# Patient Record
Sex: MALE | Race: WHITE | NOT HISPANIC OR LATINO | Employment: OTHER | URBAN - METROPOLITAN AREA
[De-identification: names, ages, dates, MRNs, and addresses within clinical notes are randomized per-mention and may not be internally consistent; named-entity substitution may affect disease eponyms.]

---

## 2022-04-25 DIAGNOSIS — R05.1 ACUTE COUGH: Primary | ICD-10-CM

## 2022-09-23 ENCOUNTER — ANESTHESIA EVENT (OUTPATIENT)
Dept: SURGERY | Facility: HOSPITAL | Age: 72
DRG: 521 | End: 2022-09-23
Payer: MEDICARE

## 2022-09-23 ENCOUNTER — HOSPITAL ENCOUNTER (INPATIENT)
Facility: HOSPITAL | Age: 72
LOS: 5 days | Discharge: NURSING, REHAB OR LONG-TERM CARE | DRG: 521 | End: 2022-09-28
Attending: EMERGENCY MEDICINE | Admitting: FAMILY MEDICINE
Payer: MEDICARE

## 2022-09-23 DIAGNOSIS — Z95.5 H/O HEART ARTERY STENT: ICD-10-CM

## 2022-09-23 DIAGNOSIS — S72.142A: Primary | ICD-10-CM

## 2022-09-23 DIAGNOSIS — S72.002A DISPLACED FRACTURE OF LEFT FEMORAL NECK: ICD-10-CM

## 2022-09-23 DIAGNOSIS — Z01.818 PRE-OP EVALUATION: ICD-10-CM

## 2022-09-23 DIAGNOSIS — M84.452A: ICD-10-CM

## 2022-09-23 DIAGNOSIS — Z86.79 H/O CHF: ICD-10-CM

## 2022-09-23 DIAGNOSIS — I25.10 CAD (CORONARY ARTERY DISEASE): ICD-10-CM
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
## 2022-09-23 DIAGNOSIS — Z95.810 AICD (AUTOMATIC CARDIOVERTER/DEFIBRILLATOR) PRESENT: ICD-10-CM

## 2022-09-23 PROBLEM — I50.9 CHF (CONGESTIVE HEART FAILURE): Status: ACTIVE | Noted: 2022-09-23

## 2022-09-23 LAB
ANION GAP SERPL CALC-SCNC: 11 MMOL/L (ref 8–16)
AV INDEX (PROSTH): 0.91
AV MEAN GRADIENT: 3 MMHG
AV PEAK GRADIENT: 7 MMHG
AV VALVE AREA: 3.13 CM2
AV VELOCITY RATIO: 0.85
BASOPHILS # BLD AUTO: 0.03 K/UL (ref 0–0.2)
BASOPHILS NFR BLD: 0.3 % (ref 0–1.9)
BSA FOR ECHO PROCEDURE: 2 M2
BUN SERPL-MCNC: 9 MG/DL (ref 8–23)
CALCIUM SERPL-MCNC: 8.6 MG/DL (ref 8.7–10.5)
CHLORIDE SERPL-SCNC: 104 MMOL/L (ref 95–110)
CO2 SERPL-SCNC: 27 MMOL/L (ref 23–29)
CREAT SERPL-MCNC: 0.8 MG/DL (ref 0.5–1.4)
CV ECHO LV RWT: 0.5 CM
DIFFERENTIAL METHOD: ABNORMAL
DOP CALC AO PEAK VEL: 1.3 M/S
DOP CALC AO VTI: 20.1 CM
DOP CALC LVOT AREA: 3.5 CM2
DOP CALC LVOT DIAMETER: 2.1 CM
DOP CALC LVOT PEAK VEL: 1.1 M/S
DOP CALC LVOT STROKE VOLUME: 63.01 CM3
DOP CALCLVOT PEAK VEL VTI: 18.2 CM
ECHO LV POSTERIOR WALL: 1.3 CM (ref 0.6–1.1)
EJECTION FRACTION: 35 %
EOSINOPHIL # BLD AUTO: 0 K/UL (ref 0–0.5)
EOSINOPHIL NFR BLD: 0.2 % (ref 0–8)
ERYTHROCYTE [DISTWIDTH] IN BLOOD BY AUTOMATED COUNT: 15.1 % (ref 11.5–14.5)
EST. GFR  (NO RACE VARIABLE): >60 ML/MIN/1.73 M^2
ESTIMATED AVG GLUCOSE: 108 MG/DL (ref 68–131)
FRACTIONAL SHORTENING: 27 % (ref 28–44)
GLUCOSE SERPL-MCNC: 106 MG/DL (ref 70–110)
HBA1C MFR BLD: 5.4 % (ref 4–5.6)
HCT VFR BLD AUTO: 47.7 % (ref 40–54)
HGB BLD-MCNC: 15.7 G/DL (ref 14–18)
IMM GRANULOCYTES # BLD AUTO: 0.03 K/UL (ref 0–0.04)
IMM GRANULOCYTES NFR BLD AUTO: 0.3 % (ref 0–0.5)
INR PPP: 1 (ref 0.8–1.2)
INTERVENTRICULAR SEPTUM: 1.2 CM (ref 0.6–1.1)
IVC DIAMETER: 1.6 CM
LA MAJOR: 5 CM
LA MINOR: 4.7 CM
LA WIDTH: 3.2 CM
LEFT ATRIUM SIZE: 3.5 CM
LEFT ATRIUM VOLUME INDEX: 23.2 ML/M2
LEFT ATRIUM VOLUME: 46.13 CM3
LEFT INTERNAL DIMENSION IN SYSTOLE: 3.8 CM (ref 2.1–4)
LEFT VENTRICLE MASS INDEX: 132 G/M2
LEFT VENTRICULAR INTERNAL DIMENSION IN DIASTOLE: 5.2 CM (ref 3.5–6)
LEFT VENTRICULAR MASS: 263.45 G
LYMPHOCYTES # BLD AUTO: 1.4 K/UL (ref 1–4.8)
LYMPHOCYTES NFR BLD: 15.3 % (ref 18–48)
MCH RBC QN AUTO: 34 PG (ref 27–31)
MCHC RBC AUTO-ENTMCNC: 32.9 G/DL (ref 32–36)
MCV RBC AUTO: 103 FL (ref 82–98)
MONOCYTES # BLD AUTO: 0.5 K/UL (ref 0.3–1)
MONOCYTES NFR BLD: 5.4 % (ref 4–15)
MV PEAK GRADIENT: 50 MMHG
NEUTROPHILS # BLD AUTO: 7.4 K/UL (ref 1.8–7.7)
NEUTROPHILS NFR BLD: 78.5 % (ref 38–73)
NRBC BLD-RTO: 0 /100 WBC
PISA MRMAX VEL: 3.5 M/S
PISA TR MAX VEL: 3.48 M/S
PLATELET # BLD AUTO: 93 K/UL (ref 150–450)
PMV BLD AUTO: 11.3 FL (ref 9.2–12.9)
POCT GLUCOSE: 122 MG/DL (ref 70–110)
POCT GLUCOSE: 90 MG/DL (ref 70–110)
POTASSIUM SERPL-SCNC: 3.7 MMOL/L (ref 3.5–5.1)
PROTHROMBIN TIME: 10.4 SEC (ref 9–12.5)
RA MAJOR: 5.4 CM
RA PRESSURE: 3 MMHG
RA WIDTH: 3.6 CM
RBC # BLD AUTO: 4.62 M/UL (ref 4.6–6.2)
RIGHT VENTRICULAR END-DIASTOLIC DIMENSION: 4.1 CM
SARS-COV-2 RDRP RESP QL NAA+PROBE: NEGATIVE
SODIUM SERPL-SCNC: 142 MMOL/L (ref 136–145)
TR MAX PG: 48 MMHG
TSH SERPL DL<=0.005 MIU/L-ACNC: 0.99 UIU/ML (ref 0.4–4)
TV REST PULMONARY ARTERY PRESSURE: 51 MMHG
WBC # BLD AUTO: 9.4 K/UL (ref 3.9–12.7)

## 2022-09-23 PROCEDURE — 96374 THER/PROPH/DIAG INJ IV PUSH: CPT

## 2022-09-23 PROCEDURE — 93010 EKG 12-LEAD: ICD-10-PCS | Mod: ,,, | Performed by: INTERNAL MEDICINE

## 2022-09-23 PROCEDURE — 99223 PR INITIAL HOSPITAL CARE,LEVL III: ICD-10-PCS | Mod: ,,, | Performed by: INTERNAL MEDICINE

## 2022-09-23 PROCEDURE — 63600175 PHARM REV CODE 636 W HCPCS: Performed by: STUDENT IN AN ORGANIZED HEALTH CARE EDUCATION/TRAINING PROGRAM

## 2022-09-23 PROCEDURE — 83036 HEMOGLOBIN GLYCOSYLATED A1C: CPT

## 2022-09-23 PROCEDURE — 25000003 PHARM REV CODE 250: Performed by: NURSE PRACTITIONER

## 2022-09-23 PROCEDURE — 99223 PR INITIAL HOSPITAL CARE,LEVL III: ICD-10-PCS | Mod: ,,, | Performed by: NURSE PRACTITIONER

## 2022-09-23 PROCEDURE — 80048 BASIC METABOLIC PNL TOTAL CA: CPT | Performed by: EMERGENCY MEDICINE

## 2022-09-23 PROCEDURE — 93005 ELECTROCARDIOGRAM TRACING: CPT

## 2022-09-23 PROCEDURE — 99900035 HC TECH TIME PER 15 MIN (STAT)

## 2022-09-23 PROCEDURE — 25000003 PHARM REV CODE 250: Performed by: FAMILY MEDICINE

## 2022-09-23 PROCEDURE — 63600175 PHARM REV CODE 636 W HCPCS

## 2022-09-23 PROCEDURE — U0002 COVID-19 LAB TEST NON-CDC: HCPCS | Performed by: EMERGENCY MEDICINE

## 2022-09-23 PROCEDURE — 25000003 PHARM REV CODE 250

## 2022-09-23 PROCEDURE — 84443 ASSAY THYROID STIM HORMONE: CPT

## 2022-09-23 PROCEDURE — 99223 1ST HOSP IP/OBS HIGH 75: CPT | Mod: ,,, | Performed by: NURSE PRACTITIONER

## 2022-09-23 PROCEDURE — 11000001 HC ACUTE MED/SURG PRIVATE ROOM

## 2022-09-23 PROCEDURE — 99285 EMERGENCY DEPT VISIT HI MDM: CPT | Mod: 25

## 2022-09-23 PROCEDURE — 85025 COMPLETE CBC W/AUTO DIFF WBC: CPT | Performed by: EMERGENCY MEDICINE

## 2022-09-23 PROCEDURE — 99223 1ST HOSP IP/OBS HIGH 75: CPT | Mod: ,,, | Performed by: INTERNAL MEDICINE

## 2022-09-23 PROCEDURE — 94761 N-INVAS EAR/PLS OXIMETRY MLT: CPT

## 2022-09-23 PROCEDURE — 93010 ELECTROCARDIOGRAM REPORT: CPT | Mod: ,,, | Performed by: INTERNAL MEDICINE

## 2022-09-23 PROCEDURE — 27000221 HC OXYGEN, UP TO 24 HOURS

## 2022-09-23 PROCEDURE — 63600175 PHARM REV CODE 636 W HCPCS: Performed by: EMERGENCY MEDICINE

## 2022-09-23 PROCEDURE — 85610 PROTHROMBIN TIME: CPT | Performed by: STUDENT IN AN ORGANIZED HEALTH CARE EDUCATION/TRAINING PROGRAM

## 2022-09-23 RX ORDER — ACETAMINOPHEN 325 MG/1
650 TABLET ORAL EVERY 4 HOURS PRN
Status: DISCONTINUED | OUTPATIENT
Start: 2022-09-23 | End: 2022-09-24

## 2022-09-23 RX ORDER — HYDROMORPHONE HYDROCHLORIDE 1 MG/ML
1 INJECTION, SOLUTION INTRAMUSCULAR; INTRAVENOUS; SUBCUTANEOUS EVERY 4 HOURS PRN
Status: DISCONTINUED | OUTPATIENT
Start: 2022-09-23 | End: 2022-09-23

## 2022-09-23 RX ORDER — IBUPROFEN 200 MG
16 TABLET ORAL
Status: DISCONTINUED | OUTPATIENT
Start: 2022-09-23 | End: 2022-09-28 | Stop reason: HOSPADM

## 2022-09-23 RX ORDER — SODIUM CHLORIDE 0.9 % (FLUSH) 0.9 %
5 SYRINGE (ML) INJECTION
Status: DISCONTINUED | OUTPATIENT
Start: 2022-09-23 | End: 2022-09-28 | Stop reason: HOSPADM

## 2022-09-23 RX ORDER — FLUTICASONE PROPIONATE 50 MCG
2 SPRAY, SUSPENSION (ML) NASAL DAILY
COMMUNITY
Start: 2022-04-20

## 2022-09-23 RX ORDER — METOPROLOL SUCCINATE 200 MG/1
200 TABLET, EXTENDED RELEASE ORAL DAILY
COMMUNITY

## 2022-09-23 RX ORDER — BUDESONIDE AND FORMOTEROL FUMARATE DIHYDRATE 160; 4.5 UG/1; UG/1
2 AEROSOL RESPIRATORY (INHALATION) 2 TIMES DAILY
COMMUNITY
Start: 2022-06-07

## 2022-09-23 RX ORDER — FENTANYL CITRATE 50 UG/ML
50 INJECTION, SOLUTION INTRAMUSCULAR; INTRAVENOUS
Status: COMPLETED | OUTPATIENT
Start: 2022-09-23 | End: 2022-09-23

## 2022-09-23 RX ORDER — ASPIRIN 81 MG/1
81 TABLET ORAL DAILY
COMMUNITY

## 2022-09-23 RX ORDER — LOSARTAN POTASSIUM 25 MG/1
25 TABLET ORAL DAILY
Status: ON HOLD | COMMUNITY
End: 2022-09-28 | Stop reason: HOSPADM

## 2022-09-23 RX ORDER — IBUPROFEN 200 MG
24 TABLET ORAL
Status: DISCONTINUED | OUTPATIENT
Start: 2022-09-23 | End: 2022-09-28 | Stop reason: HOSPADM

## 2022-09-23 RX ORDER — LIDOCAINE 50 MG/G
1 PATCH TOPICAL DAILY PRN
Status: DISCONTINUED | OUTPATIENT
Start: 2022-09-23 | End: 2022-09-28 | Stop reason: HOSPADM

## 2022-09-23 RX ORDER — HYDROMORPHONE HYDROCHLORIDE 1 MG/ML
1 INJECTION, SOLUTION INTRAMUSCULAR; INTRAVENOUS; SUBCUTANEOUS EVERY 6 HOURS PRN
Status: DISCONTINUED | OUTPATIENT
Start: 2022-09-23 | End: 2022-09-26

## 2022-09-23 RX ORDER — GLUCAGON 1 MG
1 KIT INJECTION
Status: DISCONTINUED | OUTPATIENT
Start: 2022-09-23 | End: 2022-09-28 | Stop reason: HOSPADM

## 2022-09-23 RX ORDER — CETIRIZINE HYDROCHLORIDE 10 MG/1
10 TABLET ORAL DAILY
COMMUNITY
Start: 2022-04-20

## 2022-09-23 RX ORDER — IBUPROFEN 200 MG
TABLET ORAL
COMMUNITY
Start: 2022-06-27

## 2022-09-23 RX ORDER — PRAVASTATIN SODIUM 40 MG/1
40 TABLET ORAL DAILY
Status: DISCONTINUED | OUTPATIENT
Start: 2022-09-24 | End: 2022-09-28 | Stop reason: HOSPADM

## 2022-09-23 RX ORDER — METHYLPREDNISOLONE 4 MG/1
TABLET ORAL
COMMUNITY
Start: 2022-04-20 | End: 2022-09-23

## 2022-09-23 RX ORDER — OXYCODONE AND ACETAMINOPHEN 5; 325 MG/1; MG/1
1 TABLET ORAL EVERY 6 HOURS PRN
Status: DISCONTINUED | OUTPATIENT
Start: 2022-09-23 | End: 2022-09-26

## 2022-09-23 RX ORDER — BUPROPION HYDROCHLORIDE 150 MG/1
150 TABLET, FILM COATED, EXTENDED RELEASE ORAL 2 TIMES DAILY
COMMUNITY
Start: 2022-06-11

## 2022-09-23 RX ORDER — HYDROMORPHONE HYDROCHLORIDE 1 MG/ML
1 INJECTION, SOLUTION INTRAMUSCULAR; INTRAVENOUS; SUBCUTANEOUS ONCE
Status: DISCONTINUED | OUTPATIENT
Start: 2022-09-23 | End: 2022-09-23

## 2022-09-23 RX ORDER — METOPROLOL TARTRATE 50 MG/1
50 TABLET ORAL 2 TIMES DAILY
Status: DISCONTINUED | OUTPATIENT
Start: 2022-09-23 | End: 2022-09-28 | Stop reason: HOSPADM

## 2022-09-23 RX ORDER — ALBUTEROL SULFATE 90 UG/1
2 AEROSOL, METERED RESPIRATORY (INHALATION) EVERY 6 HOURS PRN
COMMUNITY
Start: 2022-04-15

## 2022-09-23 RX ORDER — DM/P-EPHED/ACETAMINOPH/DOXYLAM 30-7.5/3
LIQUID (ML) ORAL
COMMUNITY
Start: 2022-06-18

## 2022-09-23 RX ORDER — MUPIROCIN 20 MG/G
OINTMENT TOPICAL 2 TIMES DAILY
Status: COMPLETED | OUTPATIENT
Start: 2022-09-23 | End: 2022-09-27

## 2022-09-23 RX ORDER — SPIRONOLACTONE 25 MG/1
25 TABLET ORAL DAILY
Status: ON HOLD | COMMUNITY
End: 2022-09-28 | Stop reason: HOSPADM

## 2022-09-23 RX ORDER — AMOXICILLIN 250 MG
1 CAPSULE ORAL 2 TIMES DAILY PRN
Status: DISCONTINUED | OUTPATIENT
Start: 2022-09-23 | End: 2022-09-28 | Stop reason: HOSPADM

## 2022-09-23 RX ORDER — TALC
9 POWDER (GRAM) TOPICAL NIGHTLY PRN
Status: DISCONTINUED | OUTPATIENT
Start: 2022-09-23 | End: 2022-09-28 | Stop reason: HOSPADM

## 2022-09-23 RX ORDER — IPRATROPIUM BROMIDE AND ALBUTEROL SULFATE 2.5; .5 MG/3ML; MG/3ML
3 SOLUTION RESPIRATORY (INHALATION) EVERY 4 HOURS PRN
Status: DISCONTINUED | OUTPATIENT
Start: 2022-09-23 | End: 2022-09-25

## 2022-09-23 RX ORDER — PRAVASTATIN SODIUM 40 MG/1
40 TABLET ORAL DAILY
COMMUNITY

## 2022-09-23 RX ORDER — ACETAMINOPHEN 325 MG/1
650 TABLET ORAL EVERY 8 HOURS PRN
Status: DISCONTINUED | OUTPATIENT
Start: 2022-09-23 | End: 2022-09-24

## 2022-09-23 RX ORDER — ONDANSETRON 8 MG/1
8 TABLET, ORALLY DISINTEGRATING ORAL EVERY 8 HOURS PRN
Status: DISCONTINUED | OUTPATIENT
Start: 2022-09-23 | End: 2022-09-28 | Stop reason: HOSPADM

## 2022-09-23 RX ORDER — ONDANSETRON 2 MG/ML
4 INJECTION INTRAMUSCULAR; INTRAVENOUS EVERY 8 HOURS PRN
Status: DISCONTINUED | OUTPATIENT
Start: 2022-09-23 | End: 2022-09-23

## 2022-09-23 RX ORDER — INSULIN ASPART 100 [IU]/ML
1-10 INJECTION, SOLUTION INTRAVENOUS; SUBCUTANEOUS
Status: DISCONTINUED | OUTPATIENT
Start: 2022-09-23 | End: 2022-09-26

## 2022-09-23 RX ORDER — FUROSEMIDE 20 MG/1
20 TABLET ORAL 2 TIMES DAILY
COMMUNITY

## 2022-09-23 RX ORDER — MORPHINE SULFATE 2 MG/ML
2 INJECTION, SOLUTION INTRAMUSCULAR; INTRAVENOUS EVERY 4 HOURS PRN
Status: DISCONTINUED | OUTPATIENT
Start: 2022-09-23 | End: 2022-09-23

## 2022-09-23 RX ADMIN — MORPHINE SULFATE 2 MG: 2 INJECTION, SOLUTION INTRAMUSCULAR; INTRAVENOUS at 11:09

## 2022-09-23 RX ADMIN — METOPROLOL TARTRATE 50 MG: 50 TABLET, FILM COATED ORAL at 04:09

## 2022-09-23 RX ADMIN — FENTANYL CITRATE 50 MCG: 50 INJECTION INTRAMUSCULAR; INTRAVENOUS at 09:09

## 2022-09-23 RX ADMIN — MUPIROCIN: 20 OINTMENT TOPICAL at 04:09

## 2022-09-23 RX ADMIN — LIDOCAINE 1 PATCH: 50 PATCH CUTANEOUS at 11:09

## 2022-09-23 RX ADMIN — HYDROMORPHONE HYDROCHLORIDE 1 MG: 1 INJECTION, SOLUTION INTRAMUSCULAR; INTRAVENOUS; SUBCUTANEOUS at 12:09

## 2022-09-23 RX ADMIN — HYDROMORPHONE HYDROCHLORIDE 1 MG: 1 INJECTION, SOLUTION INTRAMUSCULAR; INTRAVENOUS; SUBCUTANEOUS at 04:09

## 2022-09-23 RX ADMIN — MUPIROCIN: 20 OINTMENT TOPICAL at 08:09

## 2022-09-23 NOTE — PLAN OF CARE
[facility] - Emergency Department  Initial Discharge Assessment       Primary Care Provider: Primary Doctor No    Admission Diagnosis: CAD (coronary artery disease) [I25.10]  Displaced fracture of left femoral neck [S72.002A]  Pre-op evaluation [Z01.818]  Fall, initial encounter [W19.XXXA]  Closed 2-part intertrochanteric fracture of proximal end of left femur, initial encounter [S72.142A]    Admission Date: 9/23/2022  Expected Discharge Date:   surgery scheduled for tomorrow @ 0800 by Dr. Woodruff.   Discharge Barriers Identified: (P) None    Payor: [employer] MEDICARE / Plan: [employer] 65 / Product Type: Medicare Advantage /     Extended Emergency Contact Information  Primary Emergency Contact: joshua mclaughlin  Address: [address] [place] [address] [place] [address] [facility]  Home Phone: [phone]  Relation: Spouse    Discharge Plan A: (P) Home Health  Discharge Plan B: (P) Home      [id number]  [place] - [address]  [address]  [place] [address]  Phone: [phone] Fax: [phone]      Initial Assessment (most recent)       Adult Discharge Assessment - 09/23/22 1343          Discharge Assessment    Assessment Type Discharge Planning Assessment (P)      Confirmed/corrected address, phone number and insurance Yes (P)      Source of Information patient;family (P)      When was your last doctors appointment? -- (P)    years ago    Reason For Admission fall (P)      Lives With spouse (P)      Do you expect to return to your current living situation? Yes (P)      Do you have help at home or someone to help you manage your care at home? No (P)      Current cognitive status: Alert/Oriented (P)      Walking or Climbing Stairs Difficulty none (P)      Home Accessibility wheelchair accessible (P)      Home Layout Able to live on 1st floor (P)      Equipment Currently Used at Home walker, rolling;wheelchair;cane, straight (P)    prn as needed    Patient currently  being followed by outpatient case management? No (P)      Do you currently have service(s) that help you manage your care at home? No (P)      Do you take prescription medications? Yes (P)      Do you have prescription coverage? Yes (P)      Is the patient taking medications as prescribed? no (P)      Who is going to help you get home at discharge? joshua mclaughlin (Spouse)   [phone] (P)      How do you get to doctors appointments? car, drives self;family or friend will provide (P)      Are you on dialysis? No (P)      Discharge Plan A Home Health (P)      Discharge Plan B Home (P)      Discharge Plan discussed with: Patient;Spouse/sig other (P)      Discharge Barriers Identified None (P)         Physical Activity    On average, how many days per week do you engage in moderate to strenuous exercise (like a brisk walk)? 1 day (P)         Housing Stability    In the last 12 months, was there a time when you were not able to pay the mortgage or rent on time? No (P)         Transportation Needs    In the past 12 months, has lack of transportation kept you from medical appointments or from getting medications? No (P)      In the past 12 months, has lack of transportation kept you from meetings, work, or from getting things needed for daily living? No (P)         Food Insecurity    Within the past 12 months, you worried that your food would run out before you got the money to buy more. Never true (P)      Within the past 12 months, the food you bought just didn't last and you didn't have money to get more. Never true (P)         Stress    Do you feel stress - tense, restless, nervous, or anxious, or unable to sleep at night because your mind is troubled all the time - these days? Rather much (P)         Social Connections    In a typical week, how many times do you talk on the phone with family, friends, or neighbors? Never (P)      How often do you get together with friends or relatives? Twice a week (P)    neighbors  "check on them    How often do you attend Gnosticist or Sabianist services? Never (P)      Do you belong to any clubs or organizations such as Gnosticist groups, unions, fraternal or athletic groups, or school groups? No (P)      Are you , , , , never , or living with a partner?  (P)         Relationship/Environment    Name(s) of Who Lives With Patient joshua mclaughlin (Spouse)   [phone] (P)                    MICHEL spoke with pt and wife Manjeet at bedside. Pt and wife have been having a hard time at home since repairs have been needed at their residence. Pt states neighbors come by to check in on them, and kids mow the lawn. Pt states they could "use more help at home." MICHEL confirmed pt has [employer]. MICHEL sent fax notification to [facility] requesting auth for Home Health at d/c. Pt's wife will transport home. Pt has equipement he only uses as needed: rolling walker, can, and wheelchair. MICHEL discussed medication compliance. Pt has not been able to see PCP since after [facility], his regular provider had shut down. MICHEL requested follow up with [facility] clinic via [facility]. MICHEL provided information on dry erase board and encouraged them to reach out for any needs.    Cheyenne Zapata, [facility] [place]  [facility] Social Work  [phone]               "

## 2022-09-23 NOTE — SUBJECTIVE & OBJECTIVE
No past medical history on file.    No past surgical history on file.    Review of patient's allergies indicates:  No Known Allergies    No current facility-administered medications on file prior to encounter.     Current Outpatient Medications on File Prior to Encounter   Medication Sig    albuterol (PROVENTIL/VENTOLIN HFA) 90 mcg/actuation inhaler Inhale 2 puffs into the lungs every 6 (six) hours as needed.    cetirizine (ZYRTEC) 10 MG tablet Take 10 mg by mouth once daily.    fluticasone propionate (FLONASE) 50 mcg/actuation nasal spray 2 sprays by Each Nostril route once daily.    furosemide (LASIX) 20 MG tablet Take 20 mg by mouth 2 (two) times daily.    losartan (COZAAR) 25 MG tablet Take 25 mg by mouth once daily.    metoprolol succinate (TOPROL-XL) 200 MG 24 hr tablet Take 200 mg by mouth once daily.    pravastatin (PRAVACHOL) 40 MG tablet Take 40 mg by mouth once daily.    spironolactone (ALDACTONE) 25 MG tablet Take 25 mg by mouth once daily.    SYMBICORT 160-4.5 mcg/actuation HFAA Inhale 2 puffs into the lungs 2 (two) times a day.    aspirin (ECOTRIN) 81 MG EC tablet Take 81 mg by mouth once daily.    buPROPion HCL, smoking deter, (ZYBAN) 150 mg TBSR 12 hr tablet Take 150 mg by mouth 2 (two) times a day.    nicotine (NICODERM CQ) 21 mg/24 hr SMARTSIG:Patch(s) Topical Daily    nicotine polacrilex 2 MG Lozg SMARTSI Lozenge(s) By Mouth Every 2 Hours PRN    [DISCONTINUED] methylPREDNISolone (MEDROL DOSEPACK) 4 mg tablet use as directed     Family History    None       Tobacco Use    Smoking status: Not on file    Smokeless tobacco: Not on file   Substance and Sexual Activity    Alcohol use: Not on file    Drug use: Not on file    Sexual activity: Not on file     Review of Systems   Constitutional: Negative for chills, decreased appetite, diaphoresis and fever.   Cardiovascular:  Positive for dyspnea on exertion. Negative for chest pain, claudication, cyanosis, irregular heartbeat, leg swelling,  near-syncope, orthopnea, palpitations, paroxysmal nocturnal dyspnea and syncope.   Respiratory:  Negative for cough, hemoptysis, shortness of breath and wheezing.    Gastrointestinal:  Negative for bloating, abdominal pain, constipation, diarrhea, melena, nausea and vomiting.   Neurological:  Negative for dizziness and weakness.   Objective:     Vital Signs (Most Recent):  Temp: 97.5 °F (36.4 °C) (09/23/22 1337)  Pulse: 86 (09/23/22 1337)  Resp: 20 (09/23/22 1337)  BP: (!) 150/85 (09/23/22 1337)  SpO2: 97 % (09/23/22 1337)   Vital Signs (24h Range):  Temp:  [96.9 °F (36.1 °C)-97.5 °F (36.4 °C)] 97.5 °F (36.4 °C)  Pulse:  [] 86  Resp:  [16-20] 20  SpO2:  [96 %-98 %] 97 %  BP: (124-150)/() 150/85     Weight: 80.7 kg (178 lb)  Body mass index is 25.54 kg/m².    SpO2: 97 %  O2 Device (Oxygen Therapy): nasal cannula    No intake or output data in the 24 hours ending 09/23/22 1452    Lines/Drains/Airways       Drain  Duration                  Urethral Catheter 09/23/22 1141 Coude 16 Fr. <1 day              Peripheral Intravenous Line  Duration                  Peripheral IV - Single Lumen 09/23/22 0812 20 G Left;Posterior Hand <1 day                    Physical Exam  Constitutional:       General: He is not in acute distress.     Appearance: He is well-developed.   Cardiovascular:      Rate and Rhythm: Normal rate and regular rhythm.      Heart sounds: No murmur heard.    No gallop.   Pulmonary:      Effort: Pulmonary effort is normal. No respiratory distress.      Breath sounds: Normal breath sounds. No wheezing.   Abdominal:      General: Bowel sounds are normal. There is no distension.      Palpations: Abdomen is soft.      Tenderness: There is no abdominal tenderness.   Skin:     General: Skin is warm and dry.      Comments: Multiple ecchymotic areas to arms bilaterally    Neurological:      Mental Status: He is alert and oriented to person, place, and time.       Significant Labs: BMP:   Recent Labs   Lab  09/23/22  0937         K 3.7      CO2 27   BUN 9   CREATININE 0.8   CALCIUM 8.6*    and CBC   Recent Labs   Lab 09/23/22  0848   WBC 9.40   HGB 15.7   HCT 47.7   PLT 93*       Significant Imaging: Echocardiogram: Transthoracic echo (TTE) complete (Cupid Only): pending

## 2022-09-23 NOTE — SUBJECTIVE & OBJECTIVE
No past medical history on file.    No past surgical history on file.    Review of patient's allergies indicates:  No Known Allergies    No current facility-administered medications on file prior to encounter.     Current Outpatient Medications on File Prior to Encounter   Medication Sig    albuterol (PROVENTIL/VENTOLIN HFA) 90 mcg/actuation inhaler Inhale 2 puffs into the lungs every 6 (six) hours as needed.    cetirizine (ZYRTEC) 10 MG tablet Take 10 mg by mouth once daily.    fluticasone propionate (FLONASE) 50 mcg/actuation nasal spray 2 sprays by Each Nostril route once daily.    furosemide (LASIX) 20 MG tablet Take 20 mg by mouth 2 (two) times daily.    losartan (COZAAR) 25 MG tablet Take 25 mg by mouth once daily.    metoprolol succinate (TOPROL-XL) 200 MG 24 hr tablet Take 200 mg by mouth once daily.    pravastatin (PRAVACHOL) 40 MG tablet Take 40 mg by mouth once daily.    spironolactone (ALDACTONE) 25 MG tablet Take 25 mg by mouth once daily.    SYMBICORT 160-4.5 mcg/actuation HFAA Inhale 2 puffs into the lungs 2 (two) times a day.    aspirin (ECOTRIN) 81 MG EC tablet Take 81 mg by mouth once daily.    buPROPion HCL, smoking deter, (ZYBAN) 150 mg TBSR 12 hr tablet Take 150 mg by mouth 2 (two) times a day.    nicotine (NICODERM CQ) 21 mg/24 hr SMARTSIG:Patch(s) Topical Daily    nicotine polacrilex 2 MG Lozg SMARTSI Lozenge(s) By Mouth Every 2 Hours PRN    [DISCONTINUED] methylPREDNISolone (MEDROL DOSEPACK) 4 mg tablet use as directed     Family History    None       Tobacco Use    Smoking status: Not on file    Smokeless tobacco: Not on file   Substance and Sexual Activity    Alcohol use: Not on file    Drug use: Not on file    Sexual activity: Not on file     Review of Systems   Constitutional:  Negative for fatigue and fever.   Respiratory:  Positive for cough. Negative for chest tightness and shortness of breath.    Cardiovascular:  Negative for chest pain, palpitations and leg swelling.    Gastrointestinal:  Negative for abdominal pain, constipation, diarrhea, nausea and vomiting.   Musculoskeletal:  Positive for arthralgias.   Neurological:  Negative for dizziness, syncope, weakness, light-headedness, numbness and headaches.   Objective:     Vital Signs (Most Recent):  Temp: 97.5 °F (36.4 °C) (09/23/22 1337)  Pulse: 86 (09/23/22 1337)  Resp: 20 (09/23/22 1337)  BP: (!) 150/85 (09/23/22 1337)  SpO2: 97 % (09/23/22 1337)   Vital Signs (24h Range):  Temp:  [96.9 °F (36.1 °C)-97.5 °F (36.4 °C)] 97.5 °F (36.4 °C)  Pulse:  [] 86  Resp:  [16-20] 20  SpO2:  [96 %-98 %] 97 %  BP: (124-150)/() 150/85     Weight: 80.7 kg (178 lb)  Body mass index is 25.54 kg/m².    Physical Exam  Constitutional:       Appearance: Normal appearance.   HENT:      Head: Normocephalic.      Mouth/Throat:      Mouth: Mucous membranes are moist.   Eyes:      Extraocular Movements: Extraocular movements intact.      Pupils: Pupils are equal, round, and reactive to light.   Cardiovascular:      Rate and Rhythm: Rhythm irregular.      Pulses: Normal pulses.      Heart sounds: Normal heart sounds.   Pulmonary:      Effort: Pulmonary effort is normal.      Breath sounds: Rhonchi and rales present.   Abdominal:      General: Bowel sounds are normal. There is distension.      Tenderness: There is no abdominal tenderness. There is no guarding.   Musculoskeletal:         General: Deformity present.      Cervical back: Normal range of motion and neck supple. No tenderness.      Comments: Left leg retracted and externally rotated  Left hip tender  ROM limited by pain  No hematoma   Skin:     Findings: No bruising.   Neurological:      General: No focal deficit present.      Mental Status: He is alert and oriented to person, place, and time.      Cranial Nerves: No cranial nerve deficit.      Sensory: No sensory deficit.      Comments: Strength 5/5 in all limbs except left leg... limited by pain  SILT  Pulses strong, irregular in  all four limbs         CRANIAL NERVES     CN III, IV, VI   Pupils are equal, round, and reactive to light.     Significant Labs: All pertinent labs within the past 24 hours have been reviewed.  CBC:   Recent Labs   Lab 09/23/22  0848   WBC 9.40   HGB 15.7   HCT 47.7   PLT 93*     CMP:   Recent Labs   Lab 09/23/22  0937      K 3.7      CO2 27      BUN 9   CREATININE 0.8   CALCIUM 8.6*   ANIONGAP 11       Significant Imaging: I have reviewed all pertinent imaging results/findings within the past 24 hours.

## 2022-09-23 NOTE — PHARMACY MED REC
"    [facility]           Pharmacy  Admission Medication History     The home medication history was taken by Heather Mauricio.      Medication history obtained from Medications listed below were obtained from: Patient's pharmacy    Based on information gathered for medication list, you may go to "Admission" then "Reconcile Home Medications" tabs to review and/or act upon those items.     The home medication list has been updated by the Pharmacy department.   Please read ALL comments highlighted in yellow.   Please address this information as you see fit.    Feel free to contact us if you have any questions or require assistance.    The medications listed below were removed from the home medication list.  Please reorder if appropriate:    Patient reports NOT TAKING the following medication(s):  Medrol dpk 4mg      No current facility-administered medications on file prior to encounter.     Current Outpatient Medications on File Prior to Encounter   Medication Sig Dispense Refill    albuterol (PROVENTIL/VENTOLIN HFA) 90 mcg/actuation inhaler Inhale 2 puffs into the lungs every 6 (six) hours as needed.      cetirizine (ZYRTEC) 10 MG tablet Take 10 mg by mouth once daily.      fluticasone propionate (FLONASE) 50 mcg/actuation nasal spray 2 sprays by Each Nostril route once daily.      furosemide (LASIX) 20 MG tablet Take 20 mg by mouth 2 (two) times daily.      losartan (COZAAR) 25 MG tablet Take 25 mg by mouth once daily.      metoprolol succinate (TOPROL-XL) 200 MG 24 hr tablet Take 200 mg by mouth once daily.      pravastatin (PRAVACHOL) 40 MG tablet Take 40 mg by mouth once daily.      spironolactone (ALDACTONE) 25 MG tablet Take 25 mg by mouth once daily.      SYMBICORT 160-4.5 mcg/actuation HFAA Inhale 2 puffs into the lungs 2 (two) times a day.      aspirin (ECOTRIN) 81 MG EC tablet Take 81 mg by mouth once daily.      buPROPion HCL, smoking deter, (ZYBAN) 150 mg TBSR 12 hr tablet Take 150 mg " by mouth 2 (two) times a day.      nicotine (NICODERM CQ) 21 mg/24 hr SMARTSIG:Patch(s) Topical Daily      nicotine polacrilex 2 MG Lozg SMARTSI Lozenge(s) By Mouth Every 2 Hours PRN      [DISCONTINUED] methylPREDNISolone (MEDROL DOSEPACK) 4 mg tablet use as directed         Please address this information as you see fit.  Feel free to contact us if you have any questions or require assistance.    Heather Mauricio  [phone]              .

## 2022-09-23 NOTE — PROGRESS NOTES
09/23/22 1545   Admission   Initial VN Admission Questions Complete   Communication Issues? None   Shift   Virtual Nurse - Rounding Complete   Pain Management Interventions care clustered;diversional activity provided;medication offered;pain management plan reviewed with patient/caregiver;quiet environment facilitated;relaxation techniques promoted   Virtual Nurse - Patient Verbalized Approval Of Camera Use;VN Rounding   Type of Frequent Check   Type Patient Rounds;Other (see comments)  (new admission)   Safety/Activity   Patient Rounds bed in low position;bed wheels locked;call light in patient/parent reach;clutter free environment maintained;visualized patient;placement of personal items at bedside;ID band on   Safety Promotion/Fall Prevention assistive device/personal item within reach;bed alarm set;Fall Risk reviewed with patient/family;high risk medications identified;medications reviewed;side rails raised x 2;instructed to call staff for mobility   Safety Precautions emergency equipment at bedside   Activity Management Rolling - L1   Positioning   Body Position log-rolled   Head of Bed (HOB) Positioning HOB at 30 degrees   Pain/Comfort/Sleep   Preferred Pain Scale number (Numeric Rating Pain Scale)   Pain Body Location - Side Left   Pain Body Location hip   Pain Rating (0-10): Rest 9   Frequency constant   Nonverbal Indicators of Pain grimace   Pain Reassessment   Pain Rating Prior to Med Admin 9   VN cued into patient's room for introduction with patient's permission.  VN role explained and informed patient that VN would be working with bedside nurse and rest of care team.  Fall risk and bed alarm protocol education provided.  Instructed patient to call for assistance.  Patient aware and agreeable.  Patient's chart, labs and vital signs reviewed.  Allowed time for questions.  No acute distress noted.  Will continue to be available as needed.
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
## 2022-09-23 NOTE — HPI
73 y/o M w/pmhx of MI (Feb '22, 3 stents, 2 other MI in past), ICD (2007), CHF, HTN, COPD.   The patient lives at home with his wife. He suffered a mechanical fall on his way to the bathroom at 2200 on the night before presentation to Emergency. He fell to his left, denies LOC, palpitations, chest pain, SOB, fevers, head trauma. He complains of pain to his left hip. He denies other trauma. He notes a recent mild cough, which he attributes to smoking. The patient has been prescribed several medications for his background conditions, but states he has taken no medications at all for approximately one month. He smokes pack/day regularly and drinks approximately 5 shots of vodka per day. He denies illicit drug use.    ED course, 124/94, , 96.9F, SpO2 98% on 3lpm. No significant lab findings. CXR shows bilateral edema. An xray of the left hip showed an impacted varus angulated left femoral neck fracture. He was admitted to the Family Medicine in-patient service for medical management before and after orthopedic surgery.
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
## 2022-09-23 NOTE — CONSULTS
[facility] Consult Note     Chief Complaint:  Left hip fracture     HPI:  This is a 72-year-old male presenting to the ED for initial evaluation of left hip pain after fall.  Patient reports that he was at home when he slipped and fell landing directly onto his left hip.  Patient experienced  immediate pain and deformity to the left hip and was subsequently unable to ambulate on this extremity.  Denies any open wounds.  Denies any prior hip injuries.  Found to have left femoral neck fracture on x-ray in the ED for which Orthopaedics was consulted.  Family medicine was also consulted and will evaluate and admit the patient.      Denies numbness or tingling.  Endorses pain, worse with movement.  No fevers or chills, no nausea or vomiting.  No cough, chest pain or SOB.    Of note, patient endorses smoking 1 pack per day.  Also endorses daily alcohol use proximally 5 shots of hard liquor daily.  Has multiple medical issues including history of multiple MI, most recently in the past year with implanted defibrillator, COPD.  Independent ambulator living at home with his wife.  Takes daily aspirin 81 mg.  Not on anticoagulants.     PMH:  No past medical history on file.  PSH:  No past surgical history on file.  FH:  Noncontributory  SH: SOC@    Meds:    No current facility-administered medications on file prior to encounter.     Current Outpatient Medications on File Prior to Encounter   Medication Sig Dispense Refill    albuterol (PROVENTIL/VENTOLIN HFA) 90 mcg/actuation inhaler Inhale 2 puffs into the lungs every 6 (six) hours as needed.      cetirizine (ZYRTEC) 10 MG tablet Take 10 mg by mouth once daily.      fluticasone propionate (FLONASE) 50 mcg/actuation nasal spray 2 sprays by Each Nostril route once daily.      furosemide (LASIX) 20 MG tablet Take 20 mg by mouth 2 (two) times daily.      losartan (COZAAR) 25 MG tablet Take 25 mg by mouth once daily.      metoprolol succinate (TOPROL-XL) 200 MG 24 hr tablet Take  "200 mg by mouth once daily.      pravastatin (PRAVACHOL) 40 MG tablet Take 40 mg by mouth once daily.      spironolactone (ALDACTONE) 25 MG tablet Take 25 mg by mouth once daily.      SYMBICORT 160-4.5 mcg/actuation HFAA Inhale 2 puffs into the lungs 2 (two) times a day.      aspirin (ECOTRIN) 81 MG EC tablet Take 81 mg by mouth once daily.      buPROPion HCL, smoking deter, (ZYBAN) 150 mg TBSR 12 hr tablet Take 150 mg by mouth 2 (two) times a day.      nicotine (NICODERM CQ) 21 mg/24 hr SMARTSIG:Patch(s) Topical Daily      nicotine polacrilex 2 MG Lozg SMARTSI Lozenge(s) By Mouth Every 2 Hours PRN      [DISCONTINUED] methylPREDNISolone (MEDROL DOSEPACK) 4 mg tablet use as directed         Allergies:  Review of patient's allergies indicates:  No Known Allergies     ROS:  otherwise negative except indicated in HPI      Exam:  Vitals:  BP (!) 150/85 (BP Location: Left arm, Patient Position: Lying)   Pulse 86   Temp 97.5 °F (36.4 °C) (Oral)   Resp 20   Ht 5' 10" (1.778 m)   Wt 80.7 kg (178 lb)   SpO2 97%   BMI 25.54 kg/m²   Gen:  Awake and alert, NAD  Resp: No increased WOB  Cards: RRR by PP  Abd:  Non-distended, benign    Diffuse patchy ecchymosis about all extremities     Left LE:  Grossly shortened and externally rotated  No open wounds or abrasions  No significant swelling  TTP about hip laterally  NonTTP about thigh, knee, leg, ankle, foot  ROM hip deferred due to known fracture  ROM normal hip, knee, ankle  TA/gastroc/EHL/FHL intact with 5/5 strength  SILT grossly  1+ DP pulse bilaterally     Labs:  Recent Labs   Lab 22  0848 22  0937   WBC 9.40  --    HGB 15.7  --    HCT 47.7  --    PLT 93*  --    *  --    RDW 15.1*  --    NA  --  142   K  --  3.7   CL  --  104   CO2  --  27   BUN  --  9   CREATININE  --  0.8   GLU  --  106       Imaging:  X-ray left hip demonstrates displaced transcervical femoral neck fracture with varus angulation.  Hip joint concentric.   "   Assessment/Plan:  [age] year old male history of CAD, multiple MI, alcohol abuse, tobacco use status post ground level fall directly on the left hip sustaining a displaced left femoral neck fracture  - Admit to family medicine team, appreciate medicine care and surgical risk stratification.  - Had a long discussion with patient and wife about recommendation for operative fixation of hip fracture.  Reviewed risk of increased morbidity and mortality of injury in the setting of multiple medical comorbidities and poor protoplasm as well as increased risk of wound complications with smoking history. Risks including bleeding, infection, damage to surrounding structures, intra-op fracture, etc reviewed.  Benefits including early mobilization reviewed.  Expectation of going down one level of functionality post-op discussed.  All questions answered.  Patient and wife would like to proceed with surgery.  - Written informed consent obtained for surgery and for possible blood transfusion.  - Case request placed for left hip hemiarthroplasty with Dr. Woodruff.  Nursing supervisor informed.  - Discussed plan with admitting team  - NPO midnight  - [place] for prolonged immobilization  - PT/OT for mobilization post-op  - NWB LLE  - 24 hours IV ancef post-op  - Recommend DVT ppx  - Pain control     Dispo:  Pending fixation, PT/OT, mobilization.     Chico Sauceda MD

## 2022-09-23 NOTE — ASSESSMENT & PLAN NOTE
S/p 3 stents after MI in Feb '22, s/p ICD 2007  States nonadherance to any medication    PLAN:  - Cardiology consult, f/u recs  - restart prescribed meds (after surgery)  - Echo, f/u results

## 2022-09-23 NOTE — ANESTHESIA PREPROCEDURE EVALUATION
"                                                                                                             09/23/2022  Ángel Curtis is a 72 y.o., male. With pmhx: HTN, CAD s/p stents 2022, HFrEF with AICD, pHTN (PAP in 50s), COPD on 3 L NC, smoker, etoh use, S/p mechanical fall with left hip fx.    Cardiology evaluated patient on 9/23/22 "RCRI 2 with 10.1% risk of MACE. May proceed with surgery at a higher yet acceptable risk"    S/f LEFT HIP HEMIARTHROPLASTY    9/23/22 TTE   The left ventricle is mildly enlarged with concentric hypertrophy and moderately decreased systolic function.   The estimated ejection fraction is 35%.   There are segmental left ventricular wall motion abnormalities.   Left ventricular diastolic dysfunction.   Mild right ventricular enlargement with normal right ventricular systolic function.   Normal central venous pressure (3 mmHg).   The estimated PA systolic pressure is 51 mmHg.   There is pulmonary hypertension.   Small anterior pericardial effusion.      Review of patient's allergies indicates:  No Known Allergies    Patient Active Problem List   Diagnosis    Fracture, hip, left, closed, initial encounter    Closed 2-part intertrochanteric fracture of proximal end of left femur, initial encounter    CAD (coronary artery disease)       No past surgical history on file.    Lab Results   Component Value Date    WBC 9.40 09/23/2022    HGB 15.7 09/23/2022    HCT 47.7 09/23/2022    PLT 93 (L) 09/23/2022     09/23/2022    K 3.7 09/23/2022     09/23/2022    CREATININE 0.8 09/23/2022    BUN 9 09/23/2022    CO2 27 09/23/2022           Pre-op Assessment    I have reviewed the Patient Summary Reports.     I have reviewed the Nursing Notes.       Review of Systems  Anesthesia Hx:  No problems with previous Anesthesia psb previous trach?    Social:  Smoker, Alcohol Use    Hematology/Oncology:         -- Cancer in past history (prostate s/p prostatectomy 2007): "   Cardiovascular:   Hypertension CAD  CABG/stent  CHF AICD 2007  S/p 3 stents after MI in Feb '22    States nonadherance to any medication   Pulmonary:   COPD 02/2022 PFT    · Spirometry is within normal limits. No obstruction.FEV1/FVC ratio was low but FEV1 was 84% thus no obstructive lung defect.   · Lung volumes are within normal limits.  · Diffusion capacity is moderately reduced (40-59% predicted).   Renal/:  Renal/ Normal     Hepatic/GI:  Hepatic/GI Normal    Musculoskeletal:   HIP FX LEFT    Endocrine:  Endocrine Normal Denies Diabetes.        Physical Exam  General: Well nourished, Cooperative, Alert and Oriented    Airway:  Mallampati: III   Mouth Opening: Normal  TM Distance: Normal  Tongue: Normal  Neck ROM: Normal ROM    Dental:  Periodontal disease    Chest/Lungs:  Normal Respiratory Rate  Sp02 mid 90s on 2L NC       Anesthesia Plan  Type of Anesthesia, risks & benefits discussed:    Anesthesia Type: Spinal, MAC  Intra-op Monitoring Plan: Standard ASA Monitors and Art Line  Post Op Pain Control Plan: multimodal analgesia and intrathecal opioid  Induction:  IV  Informed Consent: Informed consent signed with the Patient and all parties understand the risks and agree with anesthesia plan.  All questions answered.   ASA Score: 4 Emergent  Day of Surgery Review of History & Physical: H&P Update referred to the surgeon/provider.    Ready For Surgery From Anesthesia Perspective.     .

## 2022-09-23 NOTE — ED NOTES
Patient presents to ED via EMS from home due to fall with injury to his left hip. Patients left leg appears shortened and turned outward. Distal pulses palpable.

## 2022-09-23 NOTE — ASSESSMENT & PLAN NOTE
Mechanical fall 9/22 approx 2200, fell to Left side, denies LOC or head trauma  Evidence of fx on xray  Ortho has evaluated patient    PLAN:  - NPO  - Cards clearance consult  - surgery, per ortho  - PT/OT

## 2022-09-23 NOTE — ASSESSMENT & PLAN NOTE
Prior diagnosis in Care Everywhere. Endorses recent cough, sleeping sitting up. Currently SpO2 in mid90s on 2lpm. No signs of overload on exam.  Prescribed Lasix 20 at home, does not take     PLAN:  - Echo  - Cards recs  - Diurese prn

## 2022-09-23 NOTE — PROVIDER PROGRESS NOTES - EMERGENCY DEPT.
Encounter Date: 9/23/2022    ED Physician Progress Notes       SCRIBE NOTE: I, López Neil, am scribing for, and in the presence of,  Sergio Brink MD.  Physician Statement: ISergio MD, personally performed the services described in this documentation as scribed by López Neil in my presence, and it is both accurate and complete.   Physician Note:   0945 Case discussed with [facility]    0957 Spoke with Family Medicine. They will accept patient to their service.

## 2022-09-23 NOTE — PLAN OF CARE
SW faxed pt's face sheet and H&P to [facility] for Home Health services review. CM will continue to follow.       09/23/22 3098   Post-Acute Status   Post-Acute Authorization Home Health   Home Health Status Pending Payor Review

## 2022-09-23 NOTE — ED NOTES
Pt is currently in bed supine, gown on, on cardiac monitoring and SPO2. 2L O2 for comfort, and defibrillator noted to left, upper chest. Pt reports he's a smoker; nicotine patch in place by EMS to left, upper arm/shoulder. MD [place] and REINIER Bravo at [facility].

## 2022-09-23 NOTE — Clinical Note
Diagnosis: Closed 2-part intertrochanteric fracture of proximal end of left femur, initial encounter [[id number]]   Admitting Provider:: PAUL BRUNO [[id number]]   Future Attending Provider: PAUL BRUNO [[id number]]   Reason for IP Medical Treatment  (Clinical interventions that can only be accomplished in the IP setting? ) :: hip fracture, ORIF   Estimated Length of Stay:: 2 midnights   I certify that Inpatient services for greater than or equal to 2 midnights are medically necessary:: Yes   Plans for Post-Acute care--if anticipated (pick the single best option):: F. IP Rehabilitation Unit Placement   Special Needs:: Fall Risk [15]

## 2022-09-23 NOTE — ASSESSMENT & PLAN NOTE
- related to systolic etiology per patient; reports EF 20% in 2014 with MI with repeat EF this year 40%  - complaints of SOB chronic in nature; no s/s of ADHF; appears euvolemic on exam  - BB, ARB, Aldactone and Lasix as an outpatient   - BB resumed; monitor fluid volume status closely and resume remainder of regimen as BP tolerates

## 2022-09-23 NOTE — ED PROVIDER NOTES
Encounter Date: 9/23/2022    SCRIBE #1 NOTE: I, López Neil, am scribing for, and in the presence of,  Sergio Brink MD. I have scribed the following portions of the note - Other sections scribed: HPI, ROS, Physical Exam.     History     Chief Complaint   Patient presents with    Fall     Mechanical fall on slippery floor resulting in right hip injury. + shortening, rotation, and displacement.       This is a 72 y.o. male who has no past medical history on file.     The patient presents to the Emergency Department via EMS due to a fall that occurred just prior to arrival.   Patient reports he slipped and fell on his left side.  He now complains of left hip pain.  Pt also complains of intermittent nausea and vomiting x3 months, last episode last night.  He states that he has not been able to keep food down.  Pt denies syncope, back pain, or neck pain.   Pt denies use of blood thinners.  No known drug allergies.    The history is provided by the patient. No  was used.   Review of patient's allergies indicates:  No Known Allergies  No past medical history on file.  No past surgical history on file.  No family history on file.     Review of Systems   Constitutional:  Negative for chills and fever.   HENT:  Negative for sore throat.    Eyes:  Negative for redness.   Respiratory:  Negative for shortness of breath.    Cardiovascular:  Negative for chest pain.   Gastrointestinal:  Positive for nausea and vomiting. Negative for abdominal pain and diarrhea.   Genitourinary:  Negative for dysuria and hematuria.   Musculoskeletal:  Positive for arthralgias. Negative for back pain.   Skin:  Negative for rash.   Neurological:  Negative for syncope and headaches.     Physical Exam     Initial Vitals [09/23/22 0811]   BP Pulse Resp Temp SpO2   (!) 124/94 109 18 96.9 °F (36.1 °C) 98 %      MAP       --         Physical Exam    Nursing note and vitals reviewed.  Constitutional: He appears well-developed and  well-nourished. He is not diaphoretic. No distress.   HENT:   Head: Normocephalic and atraumatic.   Mouth/Throat: Oropharynx is clear and moist.   Eyes: Conjunctivae and EOM are normal. Pupils are equal, round, and reactive to light.   Neck:   Normal range of motion.  Cardiovascular:  Normal rate, regular rhythm, normal heart sounds and intact distal pulses.           Pulmonary/Chest: Breath sounds normal. No respiratory distress.   Musculoskeletal:      Cervical back: Normal range of motion.      Left hip: Tenderness present.      Comments: Left hip shortened and rotated externally.     Neurological: He is alert and oriented to person, place, and time.   Skin: Skin is warm and dry. Capillary refill takes less than 2 seconds. No rash noted. No pallor.   Psychiatric: He has a normal mood and affect. Thought content normal.       ED Course   Procedures  Labs Reviewed   CBC W/ AUTO DIFFERENTIAL - Abnormal; Notable for the following components:       Result Value     (*)     MCH 34.0 (*)     RDW 15.1 (*)     Platelets 93 (*)     Gran % 78.5 (*)     Lymph % 15.3 (*)     All other components within normal limits   BASIC METABOLIC PANEL - Abnormal; Notable for the following components:    Calcium 8.6 (*)     All other components within normal limits   SARS-COV-2 RNA AMPLIFICATION, QUAL        ECG Results              EKG 12-lead (In process)  Result time 09/23/22 09:40:14      In process by Interface, Lab In [facility] (09/23/22 09:40:14)                   Narrative:    Test Reason : Z01.818,    Vent. Rate : 101 BPM     Atrial Rate : 101 BPM     P-R Int : 174 ms          QRS Dur : 128 ms      QT Int : 368 ms       P-R-T Axes : 078 -74 093 degrees     QTc Int : 477 ms    Sinus tachycardia with occasional Premature ventricular complexes  Left axis deviation  Nonspecific intraventricular block  Nonspecific T wave abnormality  Abnormal ECG  No previous ECGs available    Referred By: [email or web address]   SELF           Confirmed By:                                    Imaging Results              X-Ray Hip 2 or 3 views Left (with Pelvis when performed) (Final result)  Result time 09/23/22 09:34:56      Final result by Navin Kilpatrick MD (09/23/22 09:34:56)                   Impression:      Recent appearing impacted varus angulated left femoral neck fracture.      Electronically signed by: Navin Kilpatrick  Date:    09/23/2022  Time:    09:34               Narrative:    EXAMINATION:  XR HIP WITH PELVIS WHEN PERFORMED, 2 OR 3 VIEWS LEFT    CLINICAL HISTORY:  left hip fracture;    TECHNIQUE:  AP view of the pelvis and frog leg lateral view of the left hip were performed.    COMPARISON:  None    FINDINGS:  Examination limited by osseous demineralization.    Recent appearing impacted and varus angulated left femoral neck fracture.    Elsewhere, no definite additional acute fractures are noted.    Degenerative findings are noted involving the spine, sacroiliac joints, pubic symphysis, both hip joints.  Phleboliths are noted.  Surgical clips project over the pelvis.    No radiopaque foreign body.                                       X-Ray Chest AP Portable (Final result)  Result time 09/23/22 09:33:03      Final result by Navin Kilpatrick MD (09/23/22 09:33:03)                   Impression:      Prominent interstitial opacities could relate to pulmonary vascular congestion/edema.  Infectious or inflammatory etiology difficult to exclude.      Electronically signed by: Navin Kilpatrick  Date:    09/23/2022  Time:    09:33               Narrative:    EXAMINATION:  XR CHEST AP PORTABLE    CLINICAL HISTORY:  Encounter for other preprocedural examination    TECHNIQUE:  Single frontal view of the chest was performed.    COMPARISON:  Chest radiograph performed 02/03/2015.    FINDINGS:  Monitoring leads overlie the chest.  Left subclavian approach AICD.    It cardiomediastinal contour appears grossly within normal limits.  Prominent interstitial opacities  are noted.    No definite pneumothorax or large volume pleural effusion.    No acute findings in the visualized abdomen.  Osseous and soft tissue structures appear without definite acute change.                                    X-Rays:   Independently Interpreted Readings:   Other Readings:  X-ray left hip:  There is impacted left intertrochanteric fracture mild displacement and without comminution or extension into the joint  Medications   fentaNYL 50 mcg/mL injection 50 mcg (50 mcg Intravenous Given 9/23/22 0904)     Medical Decision Making:   History:   Old Medical Records: I decided to obtain old medical records.  Old Records Summarized: records from clinic visits.       <> Summary of Records:   Dyspnea  Chronic low back pain  Initial Assessment:     This is an emergent evaluation of a 72 y.o.male patient with presentation of mechanical fall, left hip injury without head injury, syncope or other complaint.  Patient presents with shortened and externally rotated LLE.     Initial differentials include but are not limited to:  Fracture, +/- subluxation or dislocation.  Do not suspect and syncope, rib fracture     Plan:  X-ray of the, preop evaluation    Clinical Tests:   Lab Tests: Ordered and Reviewed  Radiological Study: Ordered and Reviewed  Medical Tests: Ordered and Reviewed        Scribe Attestation:   Scribe #1: I performed the above scribed service and the documentation accurately describes the services I performed. I attest to the accuracy of the note.      ED Course as of 09/23/22 1030   Fri Sep 23, 2022   0945 Case discussed with [facility] who will come evaluate the patient.  Plan to admit to [facility] [NP]   2306 Spoke with Family Medicine. They will accept patient to their service. [GS]   4313 I, Dr. Sergio Brink, personally performed the services described in this documentation.   All medical record entries made by the scribe were at my direction and in my presence.   I have reviewed the chart and  agree that the record is accurate and complete.   Sergio Brink MD.    [NP]      ED Course User Index  [GS] López Neil  [NP] Sergio Brink MD                 Clinical Impression:   Final diagnoses:  [Z01.818] Pre-op evaluation  [S72.142A] Closed 2-part intertrochanteric fracture of proximal end of left femur, initial encounter (Primary)  [W19.XXXA] Fall, initial encounter      ED Disposition Condition    Admit Stable                Sergio Brink MD  09/23/22 1030

## 2022-09-23 NOTE — ASSESSMENT & PLAN NOTE
- reports PCI in 2014 but none recently  - denies chest pain; reports SOB chronic in nature with no worsening  - on ASA, statin, BB, ARB and ALdactone as an outpatient  - BB and statin therapy resumed; resume ASA when okay with Orthopedic surgery; resume ARB and Aldactone as BP tolerates  - echo pending

## 2022-09-23 NOTE — CONSULTS
"[place] [facility] Surg  Cardiology  Consult Note    Patient Name: Ángel Curtis  MRN: [id number]  Admission Date: 9/23/2022  Hospital Length of Stay: 0 days  Code Status: Partial Code   Attending Provider: Ángel Leo MD   Consulting Provider: MARCUS Ferreira, ANP  Primary Care Physician: Primary Doctor No  Principal Problem:Closed 2-part intertrochanteric fracture of proximal end of left femur, initial encounter    Patient information was obtained from patient, spouse/SO, past medical records and ER records.     Inpatient consult to Cardiology-[facility]  Consult performed by: MARCUS Snell, ANP  Consult ordered by: Ha Brown MD        Subjective:     Chief Complaint:  Fall     HPI:   73yo male with CAD s/p PCI 2014, AICD with firing in 2/2022, HTN and HLP who presented to the ER following a fall. He reports slipping and falling at home with complaints of pain to his left hip. He presented to the ER for evaluation and was found to have a left femoral neck fracture. He is followed by Dr. Dave Allen- [facility] Cardiology and reports being seen earlier this year after AICD firing. He reports history of CHF with EF 20% in 2014 that improved to 40% earlier this year. He denies any chest pain with activity but is quite sedentary and sits in his recliner most of the time. He complains of SOB for "a while" that he does not feel is worsening. He denies any orthopnea, PND or edema. He reports compliance with his medication regimen. Labs CBC and BMP WNL. HR and BP stable. EKG ST PVCs LAD and non specific STTW abnormality no EKG available for comparison. Admitted to [facility] and Cardiology consulted for pre operative clearance.     Hospital Course: 9/23/2022 per HPI     No past medical history on file.    No past surgical history on file.    Review of patient's allergies indicates:  No Known Allergies    No current facility-administered medications on file prior to encounter.     Current " Outpatient Medications on File Prior to Encounter   Medication Sig    albuterol (PROVENTIL/VENTOLIN HFA) 90 mcg/actuation inhaler Inhale 2 puffs into the lungs every 6 (six) hours as needed.    cetirizine (ZYRTEC) 10 MG tablet Take 10 mg by mouth once daily.    fluticasone propionate (FLONASE) 50 mcg/actuation nasal spray 2 sprays by Each Nostril route once daily.    furosemide (LASIX) 20 MG tablet Take 20 mg by mouth 2 (two) times daily.    losartan (COZAAR) 25 MG tablet Take 25 mg by mouth once daily.    metoprolol succinate (TOPROL-XL) 200 MG 24 hr tablet Take 200 mg by mouth once daily.    pravastatin (PRAVACHOL) 40 MG tablet Take 40 mg by mouth once daily.    spironolactone (ALDACTONE) 25 MG tablet Take 25 mg by mouth once daily.    SYMBICORT 160-4.5 mcg/actuation HFAA Inhale 2 puffs into the lungs 2 (two) times a day.    aspirin (ECOTRIN) 81 MG EC tablet Take 81 mg by mouth once daily.    buPROPion HCL, smoking deter, (ZYBAN) 150 mg TBSR 12 hr tablet Take 150 mg by mouth 2 (two) times a day.    nicotine (NICODERM CQ) 21 mg/24 hr SMARTSIG:Patch(s) Topical Daily    nicotine polacrilex 2 MG Lozg SMARTSI Lozenge(s) By Mouth Every 2 Hours PRN    [DISCONTINUED] methylPREDNISolone (MEDROL DOSEPACK) 4 mg tablet use as directed     Family History    None       Tobacco Use    Smoking status: Not on file    Smokeless tobacco: Not on file   Substance and Sexual Activity    Alcohol use: Not on file    Drug use: Not on file    Sexual activity: Not on file     Review of Systems   Constitutional: Negative for chills, decreased appetite, diaphoresis and fever.   Cardiovascular:  Positive for dyspnea on exertion. Negative for chest pain, claudication, cyanosis, irregular heartbeat, leg swelling, near-syncope, orthopnea, palpitations, paroxysmal nocturnal dyspnea and syncope.   Respiratory:  Negative for cough, hemoptysis, shortness of breath and wheezing.    Gastrointestinal:  Negative for bloating,  abdominal pain, constipation, diarrhea, melena, nausea and vomiting.   Neurological:  Negative for dizziness and weakness.   Objective:     Vital Signs (Most Recent):  Temp: 97.5 °F (36.4 °C) (09/23/22 1337)  Pulse: 86 (09/23/22 1337)  Resp: 20 (09/23/22 1337)  BP: (!) 150/85 (09/23/22 1337)  SpO2: 97 % (09/23/22 1337)   Vital Signs (24h Range):  Temp:  [96.9 °F (36.1 °C)-97.5 °F (36.4 °C)] 97.5 °F (36.4 °C)  Pulse:  [] 86  Resp:  [16-20] 20  SpO2:  [96 %-98 %] 97 %  BP: (124-150)/() 150/85     Weight: 80.7 kg (178 lb)  Body mass index is 25.54 kg/m².    SpO2: 97 %  O2 Device (Oxygen Therapy): nasal cannula    No intake or output data in the 24 hours ending 09/23/22 1452    Lines/Drains/Airways       Drain  Duration                  Urethral Catheter 09/23/22 1141 Coude 16 Fr. <1 day              Peripheral Intravenous Line  Duration                  Peripheral IV - Single Lumen 09/23/22 0812 20 G Left;Posterior Hand <1 day                    Physical Exam  Constitutional:       General: He is not in acute distress.     Appearance: He is well-developed.   Cardiovascular:      Rate and Rhythm: Normal rate and regular rhythm.      Heart sounds: No murmur heard.    No gallop.   Pulmonary:      Effort: Pulmonary effort is normal. No respiratory distress.      Breath sounds: Normal breath sounds. No wheezing.   Abdominal:      General: Bowel sounds are normal. There is no distension.      Palpations: Abdomen is soft.      Tenderness: There is no abdominal tenderness.   Skin:     General: Skin is warm and dry.      Comments: Multiple ecchymotic areas to arms bilaterally    Neurological:      Mental Status: He is alert and oriented to person, place, and time.       Significant Labs: BMP:   Recent Labs   Lab 09/23/22  0937         K 3.7      CO2 27   BUN 9   CREATININE 0.8   CALCIUM 8.6*    and CBC   Recent Labs   Lab 09/23/22  0848   WBC 9.40   HGB 15.7   HCT 47.7   PLT 93*       Significant  Imaging: Echocardiogram: Transthoracic echo (TTE) complete (Cupid Only): pending     Assessment and Plan:     AICD (automatic cardioverter/defibrillator) present  - AICD in place; followed by Dr. Dave Allen  - reports firing earlier this year- February 2022 uncertain of exact etiology; patient states no recent cardiac workup with firing  - continue BB; monitor on telemetry while admitted     CHF (congestive heart failure)  - related to systolic etiology per patient; reports EF 20% in 2014 with MI with repeat EF this year 40%  - complaints of SOB chronic in nature; no s/s of ADHF; appears euvolemic on exam  - BB, ARB, Aldactone and Lasix as an outpatient   - BB resumed; monitor fluid volume status closely and resume remainder of regimen as BP tolerates     CAD (coronary artery disease)  - reports PCI in 2014 but none recently  - denies chest pain; reports SOB chronic in nature with no worsening  - on ASA, statin, BB, ARB and ALdactone as an outpatient  - BB and statin therapy resumed; resume ASA when okay with Orthopedic surgery; resume ARB and Aldactone as BP tolerates  - echo pending     Fracture, hip, left, closed, initial encounter  - s/p mechanical fall  - orthopedics on board for further management         VTE Risk Mitigation (From admission, onward)         Ordered     IP VTE HIGH RISK PATIENT  Once         09/23/22 1120            71yo male with CAD s/p PCI, ICM s/p AICD and HTN who presented following mechanical fall. Followed by Dr. Dave Allen in cardiology. Xray with left femoral neck fracture in need of orthopedic surgery. Denies chest pain but has chronic SOB. Very sedentary therefore unable to assess met equivalent. Currently chest pain free with no ADHF or ACS. Echocardiogram pending with verbal report of EF 40% per patient. Recommended  Continued medical management with statin as well as BB, ARB and Aldactone as BP tolerates. RCRI 2 with 10.1% risk of MACE. May proceed with surgery at a higher yet  acceptable risk; Updated patient and wife who verbalize understanding and agree to POC       Thank you for your consult.     MARCUS Ferreira, ANP  Cardiology   [facility] Surg

## 2022-09-24 ENCOUNTER — ANESTHESIA (OUTPATIENT)
Dept: SURGERY | Facility: HOSPITAL | Age: 72
DRG: 521 | End: 2022-09-24
Payer: MEDICARE

## 2022-09-24 PROBLEM — S72.002A DISPLACED FRACTURE OF LEFT FEMORAL NECK: Status: ACTIVE | Noted: 2022-09-24

## 2022-09-24 PROBLEM — M84.452A: Status: ACTIVE | Noted: 2022-09-24

## 2022-09-24 LAB
ALBUMIN SERPL BCP-MCNC: 2.7 G/DL (ref 3.5–5.2)
ALP SERPL-CCNC: 94 U/L (ref 55–135)
ALT SERPL W/O P-5'-P-CCNC: 16 U/L (ref 10–44)
ANION GAP SERPL CALC-SCNC: 9 MMOL/L (ref 8–16)
AST SERPL-CCNC: 21 U/L (ref 10–40)
BASOPHILS # BLD AUTO: 0.02 K/UL (ref 0–0.2)
BASOPHILS NFR BLD: 0.2 % (ref 0–1.9)
BILIRUB SERPL-MCNC: 1.7 MG/DL (ref 0.1–1)
BUN SERPL-MCNC: 14 MG/DL (ref 8–23)
CALCIUM SERPL-MCNC: 9 MG/DL (ref 8.7–10.5)
CHLORIDE SERPL-SCNC: 97 MMOL/L (ref 95–110)
CO2 SERPL-SCNC: 28 MMOL/L (ref 23–29)
CREAT SERPL-MCNC: 0.8 MG/DL (ref 0.5–1.4)
DIFFERENTIAL METHOD: ABNORMAL
EOSINOPHIL # BLD AUTO: 0.1 K/UL (ref 0–0.5)
EOSINOPHIL NFR BLD: 0.8 % (ref 0–8)
ERYTHROCYTE [DISTWIDTH] IN BLOOD BY AUTOMATED COUNT: 14.7 % (ref 11.5–14.5)
EST. GFR  (NO RACE VARIABLE): >60 ML/MIN/1.73 M^2
GLUCOSE SERPL-MCNC: 157 MG/DL (ref 70–110)
HCT VFR BLD AUTO: 42.2 % (ref 40–54)
HGB BLD-MCNC: 13.5 G/DL (ref 14–18)
IMM GRANULOCYTES # BLD AUTO: 0.05 K/UL (ref 0–0.04)
IMM GRANULOCYTES NFR BLD AUTO: 0.5 % (ref 0–0.5)
LYMPHOCYTES # BLD AUTO: 0.9 K/UL (ref 1–4.8)
LYMPHOCYTES NFR BLD: 9.8 % (ref 18–48)
MAGNESIUM SERPL-MCNC: 1.8 MG/DL (ref 1.6–2.6)
MCH RBC QN AUTO: 33.3 PG (ref 27–31)
MCHC RBC AUTO-ENTMCNC: 32 G/DL (ref 32–36)
MCV RBC AUTO: 104 FL (ref 82–98)
MONOCYTES # BLD AUTO: 0.7 K/UL (ref 0.3–1)
MONOCYTES NFR BLD: 7.8 % (ref 4–15)
NEUTROPHILS # BLD AUTO: 7.4 K/UL (ref 1.8–7.7)
NEUTROPHILS NFR BLD: 80.9 % (ref 38–73)
NRBC BLD-RTO: 0 /100 WBC
PHOSPHATE SERPL-MCNC: 3 MG/DL (ref 2.7–4.5)
PLATELET # BLD AUTO: 87 K/UL (ref 150–450)
PMV BLD AUTO: 11.4 FL (ref 9.2–12.9)
POCT GLUCOSE: 163 MG/DL (ref 70–110)
POTASSIUM SERPL-SCNC: 4 MMOL/L (ref 3.5–5.1)
PROT SERPL-MCNC: 6.4 G/DL (ref 6–8.4)
RBC # BLD AUTO: 4.06 M/UL (ref 4.6–6.2)
SODIUM SERPL-SCNC: 134 MMOL/L (ref 136–145)
WBC # BLD AUTO: 9.16 K/UL (ref 3.9–12.7)

## 2022-09-24 PROCEDURE — 25000003 PHARM REV CODE 250: Performed by: STUDENT IN AN ORGANIZED HEALTH CARE EDUCATION/TRAINING PROGRAM

## 2022-09-24 PROCEDURE — 27800505 HC CATH, RADIAL ARTERY KIT: Performed by: STUDENT IN AN ORGANIZED HEALTH CARE EDUCATION/TRAINING PROGRAM

## 2022-09-24 PROCEDURE — C1894 INTRO/SHEATH, NON-LASER: HCPCS | Performed by: STUDENT IN AN ORGANIZED HEALTH CARE EDUCATION/TRAINING PROGRAM

## 2022-09-24 PROCEDURE — 11000001 HC ACUTE MED/SURG PRIVATE ROOM

## 2022-09-24 PROCEDURE — 63600175 PHARM REV CODE 636 W HCPCS

## 2022-09-24 PROCEDURE — 94761 N-INVAS EAR/PLS OXIMETRY MLT: CPT

## 2022-09-24 PROCEDURE — 36000710: Performed by: ORTHOPAEDIC SURGERY

## 2022-09-24 PROCEDURE — 27236 PR FEMORAL FX, OPEN TX: ICD-10-PCS | Mod: LT,,, | Performed by: ORTHOPAEDIC SURGERY

## 2022-09-24 PROCEDURE — C1751 CATH, INF, PER/CENT/MIDLINE: HCPCS | Performed by: STUDENT IN AN ORGANIZED HEALTH CARE EDUCATION/TRAINING PROGRAM

## 2022-09-24 PROCEDURE — 25000003 PHARM REV CODE 250: Performed by: NURSE PRACTITIONER

## 2022-09-24 PROCEDURE — 63600175 PHARM REV CODE 636 W HCPCS: Performed by: STUDENT IN AN ORGANIZED HEALTH CARE EDUCATION/TRAINING PROGRAM

## 2022-09-24 PROCEDURE — 84100 ASSAY OF PHOSPHORUS: CPT

## 2022-09-24 PROCEDURE — 85025 COMPLETE CBC W/AUTO DIFF WBC: CPT

## 2022-09-24 PROCEDURE — 99223 1ST HOSP IP/OBS HIGH 75: CPT | Mod: 57,,, | Performed by: ORTHOPAEDIC SURGERY

## 2022-09-24 PROCEDURE — 37000008 HC ANESTHESIA 1ST 15 MINUTES: Performed by: ORTHOPAEDIC SURGERY

## 2022-09-24 PROCEDURE — 83735 ASSAY OF MAGNESIUM: CPT

## 2022-09-24 PROCEDURE — 27201121 HC TRAY,SPINAL-HYPERBARIC: Performed by: STUDENT IN AN ORGANIZED HEALTH CARE EDUCATION/TRAINING PROGRAM

## 2022-09-24 PROCEDURE — C1776 JOINT DEVICE (IMPLANTABLE): HCPCS | Performed by: ORTHOPAEDIC SURGERY

## 2022-09-24 PROCEDURE — 25000003 PHARM REV CODE 250

## 2022-09-24 PROCEDURE — 36415 COLL VENOUS BLD VENIPUNCTURE: CPT

## 2022-09-24 PROCEDURE — 99223 PR INITIAL HOSPITAL CARE,LEVL III: ICD-10-PCS | Mod: 57,,, | Performed by: ORTHOPAEDIC SURGERY

## 2022-09-24 PROCEDURE — 36000711: Performed by: ORTHOPAEDIC SURGERY

## 2022-09-24 PROCEDURE — 99900035 HC TECH TIME PER 15 MIN (STAT)

## 2022-09-24 PROCEDURE — 27201423 OPTIME MED/SURG SUP & DEVICES STERILE SUPPLY: Performed by: ORTHOPAEDIC SURGERY

## 2022-09-24 PROCEDURE — 71000033 HC RECOVERY, INTIAL HOUR: Performed by: ORTHOPAEDIC SURGERY

## 2022-09-24 PROCEDURE — 27000221 HC OXYGEN, UP TO 24 HOURS

## 2022-09-24 PROCEDURE — 80053 COMPREHEN METABOLIC PANEL: CPT

## 2022-09-24 PROCEDURE — 27236 TREAT THIGH FRACTURE: CPT | Mod: LT,,, | Performed by: ORTHOPAEDIC SURGERY

## 2022-09-24 PROCEDURE — 36620 INSERTION CATHETER ARTERY: CPT | Performed by: STUDENT IN AN ORGANIZED HEALTH CARE EDUCATION/TRAINING PROGRAM

## 2022-09-24 PROCEDURE — C1713 ANCHOR/SCREW BN/BN,TIS/BN: HCPCS | Performed by: ORTHOPAEDIC SURGERY

## 2022-09-24 PROCEDURE — 37000009 HC ANESTHESIA EA ADD 15 MINS: Performed by: ORTHOPAEDIC SURGERY

## 2022-09-24 PROCEDURE — 25000003 PHARM REV CODE 250: Performed by: ORTHOPAEDIC SURGERY

## 2022-09-24 DEVICE — HEAD +5: Type: IMPLANTABLE DEVICE | Site: HIP | Status: FUNCTIONAL

## 2022-09-24 DEVICE — PLUG BONE: Type: IMPLANTABLE DEVICE | Site: HIP | Status: FUNCTIONAL

## 2022-09-24 DEVICE — SPACER OSTEONIC UNIVERSAL 12MM: Type: IMPLANTABLE DEVICE | Site: HIP | Status: FUNCTIONAL

## 2022-09-24 DEVICE — CEMENT BONE ANTIBIO SIMPLEX P: Type: IMPLANTABLE DEVICE | Site: HIP | Status: FUNCTIONAL

## 2022-09-24 DEVICE — STEM OMNIFIT EON 132DEG SZ6: Type: IMPLANTABLE DEVICE | Site: HIP | Status: FUNCTIONAL

## 2022-09-24 RX ORDER — CEFAZOLIN SODIUM 1 G/3ML
INJECTION, POWDER, FOR SOLUTION INTRAMUSCULAR; INTRAVENOUS
Status: DISCONTINUED | OUTPATIENT
Start: 2022-09-24 | End: 2022-09-24

## 2022-09-24 RX ORDER — FUROSEMIDE 20 MG/1
20 TABLET ORAL 2 TIMES DAILY
Status: DISCONTINUED | OUTPATIENT
Start: 2022-09-25 | End: 2022-09-25

## 2022-09-24 RX ORDER — SODIUM CHLORIDE 0.9 % (FLUSH) 0.9 %
10 SYRINGE (ML) INJECTION
Status: DISCONTINUED | OUTPATIENT
Start: 2022-09-24 | End: 2022-09-28 | Stop reason: HOSPADM

## 2022-09-24 RX ORDER — TRANEXAMIC ACID 100 MG/ML
1000 INJECTION, SOLUTION INTRAVENOUS ONCE
Status: CANCELLED | OUTPATIENT
Start: 2022-09-24 | End: 2022-09-24

## 2022-09-24 RX ORDER — PROPOFOL 10 MG/ML
VIAL (ML) INTRAVENOUS
Status: DISCONTINUED | OUTPATIENT
Start: 2022-09-24 | End: 2022-09-24

## 2022-09-24 RX ORDER — HYDROMORPHONE HYDROCHLORIDE 2 MG/ML
0.5 INJECTION, SOLUTION INTRAMUSCULAR; INTRAVENOUS; SUBCUTANEOUS EVERY 5 MIN PRN
Status: DISCONTINUED | OUTPATIENT
Start: 2022-09-24 | End: 2022-09-26

## 2022-09-24 RX ORDER — CEFAZOLIN SODIUM 2 G/50ML
2 SOLUTION INTRAVENOUS ONCE
Status: CANCELLED | OUTPATIENT
Start: 2022-09-24

## 2022-09-24 RX ORDER — NAPROXEN SODIUM 220 MG/1
81 TABLET, FILM COATED ORAL DAILY
Status: DISCONTINUED | OUTPATIENT
Start: 2022-09-24 | End: 2022-09-28 | Stop reason: HOSPADM

## 2022-09-24 RX ORDER — PHENYLEPHRINE HYDROCHLORIDE 10 MG/ML
INJECTION INTRAVENOUS
Status: DISCONTINUED | OUTPATIENT
Start: 2022-09-24 | End: 2022-09-24

## 2022-09-24 RX ORDER — LIDOCAINE HYDROCHLORIDE 20 MG/ML
INJECTION, SOLUTION EPIDURAL; INFILTRATION; INTRACAUDAL; PERINEURAL
Status: DISCONTINUED | OUTPATIENT
Start: 2022-09-24 | End: 2022-09-24

## 2022-09-24 RX ORDER — CEFAZOLIN SODIUM 1 G/50ML
1 SOLUTION INTRAVENOUS
Status: COMPLETED | OUTPATIENT
Start: 2022-09-24 | End: 2022-09-25

## 2022-09-24 RX ORDER — CEFAZOLIN SODIUM 2 G/50ML
2 SOLUTION INTRAVENOUS
Status: CANCELLED | OUTPATIENT
Start: 2022-09-24

## 2022-09-24 RX ORDER — BUPIVACAINE HYDROCHLORIDE 7.5 MG/ML
INJECTION, SOLUTION INTRASPINAL
Status: DISCONTINUED | OUTPATIENT
Start: 2022-09-24 | End: 2022-09-24

## 2022-09-24 RX ORDER — HYDROMORPHONE HYDROCHLORIDE 2 MG/ML
0.2 INJECTION, SOLUTION INTRAMUSCULAR; INTRAVENOUS; SUBCUTANEOUS EVERY 5 MIN PRN
Status: DISCONTINUED | OUTPATIENT
Start: 2022-09-24 | End: 2022-09-24

## 2022-09-24 RX ORDER — ENOXAPARIN SODIUM 100 MG/ML
40 INJECTION SUBCUTANEOUS EVERY 24 HOURS
Status: DISCONTINUED | OUTPATIENT
Start: 2022-09-24 | End: 2022-09-28 | Stop reason: HOSPADM

## 2022-09-24 RX ORDER — LIDOCAINE HYDROCHLORIDE AND EPINEPHRINE 10; 10 MG/ML; UG/ML
INJECTION, SOLUTION INFILTRATION; PERINEURAL
Status: DISCONTINUED | OUTPATIENT
Start: 2022-09-24 | End: 2022-09-24 | Stop reason: HOSPADM

## 2022-09-24 RX ORDER — TRANEXAMIC ACID 100 MG/ML
INJECTION, SOLUTION INTRAVENOUS
Status: DISCONTINUED | OUTPATIENT
Start: 2022-09-24 | End: 2022-09-24

## 2022-09-24 RX ORDER — ONDANSETRON 2 MG/ML
4 INJECTION INTRAMUSCULAR; INTRAVENOUS DAILY PRN
Status: DISCONTINUED | OUTPATIENT
Start: 2022-09-24 | End: 2022-09-24

## 2022-09-24 RX ORDER — ACETAMINOPHEN 325 MG/1
650 TABLET ORAL EVERY 8 HOURS PRN
Status: DISCONTINUED | OUTPATIENT
Start: 2022-09-24 | End: 2022-09-28 | Stop reason: HOSPADM

## 2022-09-24 RX ORDER — FENTANYL CITRATE 50 UG/ML
INJECTION, SOLUTION INTRAMUSCULAR; INTRAVENOUS
Status: DISCONTINUED | OUTPATIENT
Start: 2022-09-24 | End: 2022-09-24

## 2022-09-24 RX ADMIN — OXYCODONE HYDROCHLORIDE AND ACETAMINOPHEN 1 TABLET: 5; 325 TABLET ORAL at 09:09

## 2022-09-24 RX ADMIN — HYDROMORPHONE HYDROCHLORIDE 1 MG: 1 INJECTION, SOLUTION INTRAMUSCULAR; INTRAVENOUS; SUBCUTANEOUS at 06:09

## 2022-09-24 RX ADMIN — BUPIVACAINE HYDROCHLORIDE 1.6 ML: 7.5 INJECTION, SOLUTION SUBARACHNOID at 09:09

## 2022-09-24 RX ADMIN — TRANEXAMIC ACID 1000 MG: 100 INJECTION, SOLUTION INTRAVENOUS at 10:09

## 2022-09-24 RX ADMIN — ACETAMINOPHEN 650 MG: 325 TABLET ORAL at 03:09

## 2022-09-24 RX ADMIN — OXYCODONE HYDROCHLORIDE AND ACETAMINOPHEN 1 TABLET: 5; 325 TABLET ORAL at 05:09

## 2022-09-24 RX ADMIN — TRANEXAMIC ACID 1000 MG: 100 INJECTION, SOLUTION INTRAVENOUS at 11:09

## 2022-09-24 RX ADMIN — LIDOCAINE HYDROCHLORIDE 80 MG: 20 INJECTION, SOLUTION EPIDURAL; INFILTRATION; INTRACAUDAL; PERINEURAL at 09:09

## 2022-09-24 RX ADMIN — MUPIROCIN: 20 OINTMENT TOPICAL at 09:09

## 2022-09-24 RX ADMIN — MUPIROCIN: 20 OINTMENT TOPICAL at 08:09

## 2022-09-24 RX ADMIN — PRAVASTATIN SODIUM 40 MG: 40 TABLET ORAL at 08:09

## 2022-09-24 RX ADMIN — FENTANYL CITRATE 50 MCG: 50 INJECTION, SOLUTION INTRAMUSCULAR; INTRAVENOUS at 09:09

## 2022-09-24 RX ADMIN — ASPIRIN 81 MG: 81 TABLET, CHEWABLE ORAL at 03:09

## 2022-09-24 RX ADMIN — HYDROMORPHONE HYDROCHLORIDE 1 MG: 1 INJECTION, SOLUTION INTRAMUSCULAR; INTRAVENOUS; SUBCUTANEOUS at 12:09

## 2022-09-24 RX ADMIN — PHENYLEPHRINE HYDROCHLORIDE 100 MCG: 10 INJECTION INTRAVENOUS at 09:09

## 2022-09-24 RX ADMIN — ENOXAPARIN SODIUM 40 MG: 100 INJECTION SUBCUTANEOUS at 05:09

## 2022-09-24 RX ADMIN — SODIUM CHLORIDE, SODIUM LACTATE, POTASSIUM CHLORIDE, AND CALCIUM CHLORIDE: .6; .31; .03; .02 INJECTION, SOLUTION INTRAVENOUS at 09:09

## 2022-09-24 RX ADMIN — CEFAZOLIN 2 G: 330 INJECTION, POWDER, FOR SOLUTION INTRAMUSCULAR; INTRAVENOUS at 10:09

## 2022-09-24 RX ADMIN — PROPOFOL 35 MCG/KG/MIN: 10 INJECTION, EMULSION INTRAVENOUS at 09:09

## 2022-09-24 RX ADMIN — PROPOFOL 20 MG: 10 INJECTION, EMULSION INTRAVENOUS at 09:09

## 2022-09-24 RX ADMIN — METOPROLOL TARTRATE 50 MG: 50 TABLET, FILM COATED ORAL at 08:09

## 2022-09-24 RX ADMIN — CEFAZOLIN SODIUM 1 G: 1 SOLUTION INTRAVENOUS at 05:09

## 2022-09-24 RX ADMIN — METOPROLOL TARTRATE 50 MG: 50 TABLET, FILM COATED ORAL at 09:09

## 2022-09-24 RX ADMIN — PHENYLEPHRINE HYDROCHLORIDE 60 MCG/MIN: 10 INJECTION INTRAVENOUS at 09:09

## 2022-09-24 RX ADMIN — Medication 9 MG: at 09:09

## 2022-09-24 NOTE — ASSESSMENT & PLAN NOTE
Mechanical fall 9/22 approx 2200, fell to Left side, denies LOC or head trauma  Evidence of fx on xray  Ortho has evaluated patient    PLAN:  - NPO  - Cards clearance   - Recheck platelets  - surgery, per ortho  - PT/OT

## 2022-09-24 NOTE — ANESTHESIA POSTPROCEDURE EVALUATION
Anesthesia Post Evaluation    Patient: Ángel Curtis    Procedure(s) Performed: Procedure(s) (LRB):  HEMIARTHROPLASTY, HIP (Left)    Final Anesthesia Type: spinal      Patient location during evaluation: floor  Patient participation: Yes- Able to Participate  Level of consciousness: awake and alert  Post-procedure vital signs: reviewed and stable  Pain management: adequate  Airway patency: patent    PONV status at discharge: No PONV  Anesthetic complications: no      Cardiovascular status: blood pressure returned to baseline and hemodynamically stable  Respiratory status: unassisted, spontaneous ventilation and nasal cannula  Hydration status: euvolemic  Follow-up not needed.          Vitals Value Taken Time   /69 09/24/22 1300   Temp 36.7 °C (98.1 °F) 09/24/22 1225   Pulse 65 09/24/22 1302   Resp 18 09/24/22 1230   SpO2 92 % 09/24/22 1301   Vitals shown include unvalidated device data.      Event Time   Out of Recovery 09/24/2022 13:09:06         Pain/Len Score: Pain Rating Prior to Med Admin: 9 (9/24/2022  6:18 AM)  Pain Rating Post Med Admin: 0 (Patient appears asleep; respirations even and unlabored.) (9/23/2022 10:00 AM)  Len Score: 7 (9/24/2022 12:25 PM)         1

## 2022-09-24 NOTE — HOSPITAL COURSE
The patient arrived to our department still complaining of hip pain, but much improved with morphine given in Emergency. He required 2lpm, but had no complaints of chest pain, SOB, d/n/v. Ortho was consulted and evaluated the patient in Emergency. Surgery was scheduled after clearance from Cardiology. Cardiology did not object to surgery, but recommended re-starting home meds after surgery. The patient was taken for L hip hemiarthoplasty on 9/24, with no significant complications. PT/OT worked with him post-op. His home medicines were restarted except for spironolactone and losartan which were discontinued due to the patient's well-controlled blood pressure on lasix 20mg bid and metoprolol 50mg bid. The patient was also restarted on ASA 81mg qd therapy, however Ortho would like ASA 81mg given bid for 30 post-op (until 10/26). After he can resume ASA 81mg qd. The patient resumed his COPD inhaler (albuterol-ipratropium) and started on a fluticasone-vilanterol inhaler but still required 5lpm by POD 2. By POD 4, he was making progress with PT/OT and was able to wean himself off of oxygen support. He was instructed to follow up in 2 weeks with Orthopedics clinic with Dr. Woodruff. He should also follow up with his Cardiologist and Primary Care Physician. He was told to notify his healthcare team if he feels severe worsening of his symptoms. He expressed verbal understanding of discharge instructions and return precautions.

## 2022-09-24 NOTE — ASSESSMENT & PLAN NOTE
Prior diagnosis in Care Everywhere. Endorses recent cough, sleeping sitting up. Currently SpO2 in mid90s on 2lpm. No signs of overload on exam. EF as of 9/23 - 35.  Prescribed Lasix 20 at home, does not take     PLAN:  - Cards recs  - Diurese prn

## 2022-09-24 NOTE — OP NOTE
Operative Report    PAUL TREJO  : 1950  MRN: [id number]    Date of Procedure: 2022     Pre-op Diagnosis:    Left femoral neck fracture  Osteoporotic bone fragility fracture left hip     Post-op Diagnosis:    Same    Procedure:   Left hip hemiarthroplasty     Surgeon: Lester Woodruff IV, MD     Assisting Surgeon:    Chico Sauceda MD     Anesthesiologist:     See Record    Anesthesia:    spinal    Estimated Blood Loss: 150 cc         Specimens: none    Implant:    Akua Omnifit Serjio Stem; size 6, 132 degree   Bipolar Hemiarthroplasty Head; size 51mm    Indications for surgery:   Paul Trejo is a 72 y.o. year old male who sustained a ground level fall.  Preoperative history, physical exam, and imaging were consistent with a displaced femoral neck fracture.  I had a long discussion with the patient and family about the prognosis of surgical versus nonsurgical treatment and they decided to proceed with surgical treatment.  Specifically, we discussed the possibility of decreased ambulatory function and even death after surgery for a hip fracture.  We also discussed the risks of surgery including pain, bleeding, nerve injury, vascular injury, wound healing problems, blood clot, pulmonary embolus,  as well as complications related to presence of a prosthetic joint including hip dislocation, periprosthetic infection, leg length discrepancy, leg rotational discrepancy, failure of the implant, and requirement of revision surgery.       Description of procedure:   Prior to the surgical procedure, the patient was identified in the preoperative holding area.  The patient signed an informed consent and the operative extremity was marked.  The patient was taken to the operating room and general anesthesia was induced on hospital bed prior to moving to the operating table.  The patient was positioned in the lateral decubitus position with an axillary roll ensuring the axilla was free and unimpinged.  All prominences  were padded and the patient was secured to the table.  The operative extremity was then prepped and draped in the standard sterile orthopedic fashion.  Preoperative antibiotics were administered prior to incision.  A surgical timeout was performed confirming the surgical site and procedure prior to proceeding.          The surgical site was marked for the standard posterolateral approach to the and skin incision was made.  The dissection was continued through the subcutaneous tissue down to fascia using electrocautery to maintain meticulous hemostasis.  A maldonado elevator was used to clear residual adipose tissue from the IT band, then the IT band was divided in line with the incision using electrocautery, and gently curving posterior into the gluteus kaitlin.  The greater trochanter was identified and the loose areolar tissue overlying the posterior aspect of the greater trochanter was resected revealing the piriformis tendon and accompanying short external rotators.  The piriformis tendon and insertion of the short external rotators was divided off the proximal femur and then elevated with the underlying capsule in a single sleeve.    The leg was now flexed and internally rotated, exposing the femoral neck fracture and the femoral head fragment in the acetabulum.  A corkscrew was inserted into the head fragment and then manipulated to remove the head from the acetabulum.  The head was then taken to the back table and measured.  The corresponding trial head was then inserted into the acetabulum.  Excellent suction seal was observed confirming the correct size.      Attention was then turned to the femoral neck.  The level of the femoral neck cut was marked using electrocautery at a position 1cm proximal to the lesser trochanter and 45 degrees to the long axis of the femoral shaft.  An oscillating saw was used to make the femoral neck cut.  A boxed osteotome then a canal finder was used to gain access to the  medullary  canal of the femur.  The canal was sequentially reamed until appropriate cortical contact was achieved.  Care was taken to appropriately lateralize reaming in the greater trochanteric and proximal canal region to avoid malpositioning of the stem.  The canal was then sequentially broached with attention to the appropriate anteversion and lateralization until the broach attained a snug fit in the femoral canal.  The calcar planer was then applied to the post of the broach.  Next, the trial head was applied to the broach and the hip was reduced.  The hip was taken through a full range of motion and found to be stable; leg lengths were assessed and found to be equal.  An intraoperative xray was taken which confirmed appropriate size and position of the femoral stem.  The hip was subsequently dislocated and all trial components were removed.     The femoral canal was now prepared using irrigation and suction while Akua Antibiotic Bone Cement was prepared on the back table.  A cement restrictor was inserted into the femoral canal approximately 3 cm distal to the planned level of stem insertion.  Cement was applied to the canal using a cement gun.  The appropriately sized femoral component with a canal centralizer was inserted into the canal taking care to avoid malpositioning during insertion.  Excess cement was removed as continuous pressure was applied until cement fully dried.  The head was subsequently applied to the stem.  The acetabulum was thoroughly irrigated and aspirated prior to reduction of the final prosthesis construct.  The hip was again taken through a full range of motion and leg lengths were again confirmed to be equal.      After copiously irrigating the joint, closure of the capsule and repair of the piriformis tendon with short external rotators was performed using #2 Ethibond via bone tunnels through the greater trochanter at the anatomic location.  A Hemovac drain was not required due to  excellent hemostasis during the case. The IT band was then closed with #1 vicryl suture.  The subcutaneous tissue was then closed in layers using 2.0 vicryl.  The skin was then closed using 3.0 monocryl and dermabond.  The wound was then covered with an Aquacel     The patient was then placed in the supine position for reversal of general anesthesia and extubation in the operating room prior to transfer to the PACU in stable condition.        Post-Operative Management:  The patient will be WBAT on the operative extremity with posterior hip precautions and an abductor pillow when in bed.  After discharge, the patient will follow up in my office ([phone]) in 14 days after surgery.  Dressing to be removed after 7 days and changed to dry sterile dressing, changed as needed.      Complications: No     Condition: Good     Disposition: PACU - hemodynamically stable.     Attestation: I was present and scrubbed for the entire procedure.    Implants:   Implant Name Type Inv. Item Serial No.  Lot No. LRB No. Used Action   CEMENT BONE ANTIBIO SIMPLEX P - [id number]  CEMENT BONE ANTIBIO SIMPLEX P  MOLLY [employer]. [id number] Left 2 Implanted   SPACER OSTEONIC UNIVERSAL 12MM - [id number]  SPACER OSTEONIC UNIVERSAL 12MM  MOLLY [employer]. [id number] Left 1 Implanted   STEM OMNIFIT BINDU 132DEG SZ6 - [id number]  STEM OMNIFIT BINDU 132DEG SZ6  MOLLY [employer]. [id number] Left 1 Implanted   PLUG BONE - [id number]  PLUG BONE  MOLLY [employer]. [id number] Left 1 Implanted   UHR Universal Head Bipolar Component 51/28     [id number] Left 1 Implanted   HEAD +5 - [id number]  HEAD +5  MOLLY [employer]. [id number] Left 1 Implanted

## 2022-09-24 NOTE — ASSESSMENT & PLAN NOTE
S/p 3 stents after MI in Feb '22, s/p ICD 2007  States nonadherance to any medication    PLAN:  - Cardiology consult, f/u recs post-op  - restart prescribed meds (after surgery)

## 2022-09-24 NOTE — H&P
The patient has been examined and the H&P has been reviewed:     I concur with the findings and no changes have occurred since H&P was written.     Surgery risks, benefits and alternative options discussed and understood by patient/family.    Plan to proceed with Left hip hemiarthroplasty.    Chico Sauceda MD    [facility] Consult Note     Chief Complaint:  Left hip fracture     HPI:  This is a 72-year-old male presenting to the ED for initial evaluation of left hip pain after fall.  Patient reports that he was at home when he slipped and fell landing directly onto his left hip.  Patient experienced  immediate pain and deformity to the left hip and was subsequently unable to ambulate on this extremity.  Denies any open wounds.  Denies any prior hip injuries.  Found to have left femoral neck fracture on x-ray in the ED for which Orthopaedics was consulted.  Family medicine was also consulted and will evaluate and admit the patient.      Denies numbness or tingling.  Endorses pain, worse with movement.  No fevers or chills, no nausea or vomiting.  No cough, chest pain or SOB.    Of note, patient endorses smoking 1 pack per day.  Also endorses daily alcohol use proximally 5 shots of hard liquor daily.  Has multiple medical issues including history of multiple MI, most recently in the past year with implanted defibrillator, COPD.  Independent ambulator living at home with his wife.  Takes daily aspirin 81 mg.  Not on anticoagulants.     PMH:  No past medical history on file.  PSH:  No past surgical history on file.  FH:  Noncontributory  SH: SOC@     Meds:    No current facility-administered medications on file prior to encounter.             Current Outpatient Medications on File Prior to Encounter   Medication Sig Dispense Refill    albuterol (PROVENTIL/VENTOLIN HFA) 90 mcg/actuation inhaler Inhale 2 puffs into the lungs every 6 (six) hours as needed.        cetirizine (ZYRTEC) 10 MG tablet Take 10 mg by mouth once  "daily.        fluticasone propionate (FLONASE) 50 mcg/actuation nasal spray 2 sprays by Each Nostril route once daily.        furosemide (LASIX) 20 MG tablet Take 20 mg by mouth 2 (two) times daily.        losartan (COZAAR) 25 MG tablet Take 25 mg by mouth once daily.        metoprolol succinate (TOPROL-XL) 200 MG 24 hr tablet Take 200 mg by mouth once daily.        pravastatin (PRAVACHOL) 40 MG tablet Take 40 mg by mouth once daily.        spironolactone (ALDACTONE) 25 MG tablet Take 25 mg by mouth once daily.        SYMBICORT 160-4.5 mcg/actuation HFAA Inhale 2 puffs into the lungs 2 (two) times a day.        aspirin (ECOTRIN) 81 MG EC tablet Take 81 mg by mouth once daily.        buPROPion HCL, smoking deter, (ZYBAN) 150 mg TBSR 12 hr tablet Take 150 mg by mouth 2 (two) times a day.        nicotine (NICODERM CQ) 21 mg/24 hr SMARTSIG:Patch(s) Topical Daily        nicotine polacrilex 2 MG Lozg SMARTSI Lozenge(s) By Mouth Every 2 Hours PRN        [DISCONTINUED] methylPREDNISolone (MEDROL DOSEPACK) 4 mg tablet use as directed             Allergies:  Review of patient's allergies indicates:  No Known Allergies     ROS:  otherwise negative except indicated in HPI      Exam:  Vitals:  BP (!) 150/85 (BP Location: Left arm, Patient Position: Lying)   Pulse 86   Temp 97.5 °F (36.4 °C) (Oral)   Resp 20   Ht 5' 10" (1.778 m)   Wt 80.7 kg (178 lb)   SpO2 97%   BMI 25.54 kg/m²   Gen:  Awake and alert, NAD  Resp: No increased WOB  Cards: RRR by PP  Abd:  Non-distended, benign    Diffuse patchy ecchymosis about all extremities      Left LE:  Grossly shortened and externally rotated  No open wounds or abrasions  No significant swelling  TTP about hip laterally  NonTTP about thigh, knee, leg, ankle, foot  ROM hip deferred due to known fracture  ROM normal hip, knee, ankle  TA/gastroc/EHL/FHL intact with 5/5 strength  SILT grossly  1+ DP pulse bilaterally     Labs:       Recent Labs   Lab 22  0848 22  0937 "   WBC 9.40  --    HGB 15.7  --    HCT 47.7  --    PLT 93*  --    *  --    RDW 15.1*  --    NA  --  142   K  --  3.7   CL  --  104   CO2  --  27   BUN  --  9   CREATININE  --  0.8   GLU  --  106         Imaging:  X-ray left hip demonstrates displaced transcervical femoral neck fracture with varus angulation.  Hip joint concentric.     Assessment/Plan:  [age] year old male history of CAD, multiple MI, alcohol abuse, tobacco use status post ground level fall directly on the left hip sustaining a displaced left femoral neck fracture  - Admit to family medicine team, [facility] medicine care and surgical risk stratification.  - Had a long discussion with patient and wife about recommendation for operative fixation of hip fracture.  Reviewed risk of increased morbidity and mortality of injury in the setting of multiple medical comorbidities and poor protoplasm as well as increased risk of wound complications with smoking history. Risks including bleeding, infection, damage to surrounding structures, intra-op fracture, etc reviewed.  Benefits including early mobilization reviewed.  Expectation of going down one level of functionality post-op discussed.  All questions answered.  Patient and wife would like to proceed with surgery.  - Written informed consent obtained for surgery and for possible blood transfusion.  - Case request placed for left hip hemiarthroplasty with Dr. Woodruff.  Nursing supervisor informed.  - Discussed plan with admitting team  - NPO midnight  - Castorena for prolonged immobilization  - PT/OT for mobilization post-op  - NWB LLE  - 24 hours IV ancef post-op  - Recommend DVT ppx  - Pain control     Dispo:  Pending fixation, PT/OT, mobilization.      Chico Sauceda MD

## 2022-09-24 NOTE — PLAN OF CARE
Pt AAOx3. 3L NC. NPO after midnight. BG monitored. Cardiac monitor maintained. Castorena maintained, output monitored. Pain to L hip managed with prn medication. Pre-op completed. Encouraged to call with questions/concerns/assistance. Safety.

## 2022-09-24 NOTE — PROGRESS NOTES
POST OP NOTE    Patient tolerated the procedure well. No complications. Awake from anesthesia with pain controlled.    Vitals:    09/24/22 0854   BP: 136/79   Pulse: 92   Resp: 20   Temp: 99.4 °F (37.4 °C)       Lab Results   Component Value Date    HGB 13.5 (L) 09/24/2022    HGB 15.7 09/23/2022     Lab Results   Component Value Date    HCT 42.2 09/24/2022    HCT 47.7 09/23/2022       Post OP films: pending    Exam:    Surgical dressing clean dry intact.  No saturation or strike through  No calf tenderness to palpation  No pain with passive stretch of toes  SILT Sa/Fenton/DP/SP/T  EHL/FHL/TA/GSC intact  Foot WWP, palpable DP    A/P: 72M S/P L hip hemiarthroplasty    - WBAT with posterior hip precautions  - Hip abduction pillow on at all times while in bed  - Physical therapy eval and treat   - Ancef 24 hr post op  - Multimodal pain control  - Ice pack therapy  - Recommend DVT chemoprophylaxis with lovenox while inpatient; ASA 81mg BID for 30 days upon discharge  - Clinic follow up 2 weeks with Dr. Woodruff   - Continue appropriate postop medical care per orders    Chico Sauceda MD

## 2022-09-24 NOTE — ANESTHESIA PROCEDURE NOTES
Spinal    Diagnosis: L hip fracture  Patient location during procedure: OR  Start time: 9/24/2022 9:31 AM  Timeout: 9/24/2022 9:30 AM  End time: 9/24/2022 9:36 AM    Staffing  Authorizing Provider: Leonides Keith MD  Performing Provider: Ritika Batista MD    Preanesthetic Checklist  Completed: patient identified, IV checked, site marked, risks and benefits discussed, surgical consent, monitors and equipment checked, pre-op evaluation and timeout performed  Spinal Block  Patient position: left lateral decubitus  Prep: ChloraPrep  Patient monitoring: heart rate, continuous pulse ox and frequent blood pressure checks  Approach: midline  Location: L2-3  Injection technique: single shot  CSF Fluid: clear free-flowing CSF  Needle  Needle type: pencil-tip   Needle gauge: 22 G  Needle length: 3.5 in  Additional Documentation: incremental injection and no paresthesia on injection  Needle localization: anatomical landmarks  Assessment  Ease of block: easy  Patient's tolerance of the procedure: comfortable throughout block  Additional Notes  1.6mL of 0.75% Bupivacaine w/ 8.75% Dextrose

## 2022-09-24 NOTE — SUBJECTIVE & OBJECTIVE
Interval History:   NAEON. VSSAF on 3lpm. Castorena. NPO, ortho surg scheduled to L hip today. Has Cards clearance. Plts low. EF on Echo 9/23 - 35.    Review of Systems   Constitutional:  Negative for fatigue and fever.   Respiratory:  Positive for cough. Negative for chest tightness and shortness of breath.    Cardiovascular:  Negative for chest pain, palpitations and leg swelling.   Gastrointestinal:  Negative for abdominal pain, constipation, diarrhea, nausea and vomiting.   Musculoskeletal:  Positive for arthralgias.        L hip   Neurological:  Negative for dizziness, syncope, weakness, light-headedness, numbness and headaches.       Objective:     Vital Signs (Most Recent):  Temp: 98.9 °F (37.2 °C) (09/24/22 0425)  Pulse: 78 (09/24/22 0425)  Resp: 18 (09/24/22 0618)  BP: 137/75 (09/24/22 0425)  SpO2: (!) 93 % (09/24/22 0425)   Vital Signs (24h Range):  Temp:  [96.9 °F (36.1 °C)-98.9 °F (37.2 °C)] 98.9 °F (37.2 °C)  Pulse:  [] 78  Resp:  [16-20] 18  SpO2:  [76 %-98 %] 93 %  BP: (124-150)/() 137/75     Weight: 80.7 kg (178 lb)  Body mass index is 25.54 kg/m².    Intake/Output Summary (Last 24 hours) at 9/24/2022 0704  Last data filed at 9/24/2022 0635  Gross per 24 hour   Intake --   Output 400 ml   Net -400 ml      Physical Exam  Constitutional:       Appearance: Normal appearance.   HENT:      Head: Normocephalic.      Mouth/Throat:      Mouth: Mucous membranes are moist.   Eyes:      Extraocular Movements: Extraocular movements intact.      Pupils: Pupils are equal, round, and reactive to light.   Cardiovascular:      Rate and Rhythm: Rhythm irregular.      Pulses: Normal pulses.      Heart sounds: Normal heart sounds.   Pulmonary:      Effort: Pulmonary effort is normal.      Breath sounds: Rhonchi and rales present.   Abdominal:      General: Bowel sounds are normal. There is distension.      Tenderness: There is no abdominal tenderness. There is no guarding.   Musculoskeletal:         General:  Deformity present.      Cervical back: Normal range of motion and neck supple. No tenderness.      Comments: Left leg retracted and externally rotated  Left hip tender  ROM limited by pain  No hematoma   Skin:     Findings: No bruising.   Neurological:      General: No focal deficit present.      Mental Status: He is alert and oriented to person, place, and time.      Cranial Nerves: No cranial nerve deficit.      Sensory: No sensory deficit.      Comments: Strength 5/5 in all limbs except left leg... limited by pain  SILT  Pulses strong, irregular in all four limbs       Significant Labs: All pertinent labs within the past 24 hours have been reviewed.  CBC:   Recent Labs   Lab 09/23/22  0848   WBC 9.40   HGB 15.7   HCT 47.7   PLT 93*     CMP:   Recent Labs   Lab 09/23/22  0937      K 3.7      CO2 27      BUN 9   CREATININE 0.8   CALCIUM 8.6*   ANIONGAP 11       Significant Imaging: I have reviewed all pertinent imaging results/findings within the past 24 hours.

## 2022-09-24 NOTE — BRIEF OP NOTE
[facility] Surg  Brief Operative Note    SUMMARY     Surgery Date: 9/24/2022     Surgeon(s) and Role:     * Lester Woodruff IV, MD - Primary    Assisting Surgeon: None    Pre-op Diagnosis:  Displaced fracture of left femoral neck [S72.002A]    Post-op Diagnosis:  Post-Op Diagnosis Codes:     * Displaced fracture of left femoral neck [S72.002A]    Procedure(s) (LRB):  HEMIARTHROPLASTY, HIP (Left)    Anesthesia: General    Operative Findings: see op note    Estimated Blood Loss: * No values recorded between 9/24/2022 10:18 AM and 9/24/2022 12:16 PM *    Estimated Blood Loss has been documented.         Specimens:   Specimen (24h ago, onward)      None            [id number]

## 2022-09-24 NOTE — TRANSFER OF CARE
"Anesthesia Transfer of Care Note    Patient: Ángel Curtis    Procedure(s) Performed: Procedure(s) (LRB):  HEMIARTHROPLASTY, HIP (Left)    Patient location: [facility] Surgical Floor    Anesthesia Type: general    Transport from OR: Transported from OR on 6-10 L/min O2 by face mask with adequate spontaneous ventilation    Post pain: adequate analgesia    Post assessment: no apparent anesthetic complications    Post vital signs: stable    Level of consciousness: awake and alert    Nausea/Vomiting: no nausea/vomiting    Complications: none    Transfer of care protocol was followed      Last vitals:   Visit Vitals  /79   Pulse 59   Temp 37.4 °C (99.4 °F)   Resp 18   Ht 5' 10" (1.778 m)   Wt 80.7 kg (178 lb)   SpO2 (!) 100%   BMI 25.54 kg/m²     "

## 2022-09-24 NOTE — ANESTHESIA PROCEDURE NOTES
Arterial    Diagnosis: L hip fracture    Patient location during procedure: holding area  Procedure start time: 9/24/2022 9:07 AM  Timeout: 9/24/2022 9:05 AM    Staffing  Authorizing Provider: Leonides Keith MD  Performing Provider: Ritika Batista MD    Anesthesiologist was present at the time of the procedure.    Preanesthetic Checklist  Completed: patient identified, IV checked, site marked, risks and benefits discussed, surgical consent, monitors and equipment checked, pre-op evaluation, timeout performed and anesthesia consent givenArterial  Skin Prep: chlorhexidine gluconate  Local Infiltration: lidocaine  Orientation: right  Location: radial    Catheter Size: 20 G  Catheter placement by Ultrasound guidance. Heme positive aspiration all ports.   Vessel Caliber: medium, patent, compressibility poor  Vascular Doppler:  not done  Needle advanced into vessel with real time Ultrasound guidance.  Guidewire confirmed in vessel.Insertion Attempts: 1  Assessment  Dressing: secured with tape and tegaderm  Patient: Tolerated well

## 2022-09-24 NOTE — PLAN OF CARE
Pt is AAOX4. Scheduled meds were given per MAR. Bed in lowest position, bed alarm is set. Call light within reach. Nurse will continue to monitor.   Problem: Adult Inpatient Plan of Care  Goal: Absence of Hospital-Acquired Illness or Injury  Outcome: Ongoing, Progressing  Goal: Optimal Comfort and Wellbeing  Outcome: Ongoing, Progressing     Problem: Infection  Goal: Absence of Infection Signs and Symptoms  Outcome: Ongoing, Progressing     Problem: Fall Injury Risk  Goal: Absence of Fall and Fall-Related Injury  Outcome: Ongoing, Progressing     Problem: COPD (Chronic Obstructive Pulmonary Disease) Comorbidity  Goal: Maintenance of COPD Symptom Control  Outcome: Ongoing, Progressing

## 2022-09-24 NOTE — PROGRESS NOTES
[facility] Medicine  Progress Note    Patient Name: Ángel Curtis  MRN: [id number]  Patient Class: IP- Inpatient   Admission Date: 9/23/2022  Length of Stay: 1 days  Attending Physician: Ángel Leo MD  Primary Care Provider: Primary Doctor No        Subjective:     Principal Problem:Closed 2-part intertrochanteric fracture of proximal end of left femur, initial encounter        HPI:  71 y/o M w/pmhx of MI (Feb '22, 3 stents, 2 other MI in past), ICD (2007), CHF, HTN, COPD.   The patient lives at home with his wife. He suffered a mechanical fall on his way to the bathroom at 2200 on the night before presentation to Emergency. He fell to his left, denies LOC, palpitations, chest pain, SOB, fevers, head trauma. He complains of pain to his left hip. He denies other trauma. He notes a recent mild cough, which he attributes to smoking. The patient has been prescribed several medications for his background conditions, but states he has taken no medications at all for approximately one month. He smokes pack/day regularly and drinks approximately 5 shots of vodka per day. He denies illicit drug use.    ED course, 124/94, , 96.9F, SpO2 98% on 3lpm. No significant lab findings. CXR shows bilateral edema. An xray of the left hip showed an impacted varus angulated left femoral neck fracture. He was admitted to the Family Medicine in-patient service for medical management before and after orthopedic surgery.      Overview/Hospital Course:  No notes on file    Interval History:   NAEON. VSSAF on 3lpm. Castorena. NPO, ortho surg scheduled to L hip today. Has Cards clearance. Plts low. EF on Echo 9/23 - 35.    Review of Systems   Constitutional:  Negative for fatigue and fever.   Respiratory:  Positive for cough. Negative for chest tightness and shortness of breath.    Cardiovascular:  Negative for chest pain, palpitations and leg swelling.   Gastrointestinal:  Negative for abdominal pain, constipation,  diarrhea, nausea and vomiting.   Musculoskeletal:  Positive for arthralgias.        L hip   Neurological:  Negative for dizziness, syncope, weakness, light-headedness, numbness and headaches.       Objective:     Vital Signs (Most Recent):  Temp: 98.9 °F (37.2 °C) (09/24/22 0425)  Pulse: 78 (09/24/22 0425)  Resp: 18 (09/24/22 0618)  BP: 137/75 (09/24/22 0425)  SpO2: (!) 93 % (09/24/22 0425)   Vital Signs (24h Range):  Temp:  [96.9 °F (36.1 °C)-98.9 °F (37.2 °C)] 98.9 °F (37.2 °C)  Pulse:  [] 78  Resp:  [16-20] 18  SpO2:  [76 %-98 %] 93 %  BP: (124-150)/() 137/75     Weight: 80.7 kg (178 lb)  Body mass index is 25.54 kg/m².    Intake/Output Summary (Last 24 hours) at 9/24/2022 0704  Last data filed at 9/24/2022 0635  Gross per 24 hour   Intake --   Output 400 ml   Net -400 ml      Physical Exam  Constitutional:       Appearance: Normal appearance.   HENT:      Head: Normocephalic.      Mouth/Throat:      Mouth: Mucous membranes are moist.   Eyes:      Extraocular Movements: Extraocular movements intact.      Pupils: Pupils are equal, round, and reactive to light.   Cardiovascular:      Rate and Rhythm: Rhythm irregular.      Pulses: Normal pulses.      Heart sounds: Normal heart sounds.   Pulmonary:      Effort: Pulmonary effort is normal.      Breath sounds: Rhonchi and rales present.   Abdominal:      General: Bowel sounds are normal. There is distension.      Tenderness: There is no abdominal tenderness. There is no guarding.   Musculoskeletal:         General: Deformity present.      Cervical back: Normal range of motion and neck supple. No tenderness.      Comments: Left leg retracted and externally rotated  Left hip tender  ROM limited by pain  No hematoma   Skin:     Findings: No bruising.   Neurological:      General: No focal deficit present.      Mental Status: He is alert and oriented to person, place, and time.      Cranial Nerves: No cranial nerve deficit.      Sensory: No sensory deficit.       Comments: Strength 5/5 in all limbs except left leg... limited by pain  SILT  Pulses strong, irregular in all four limbs       Significant Labs: All pertinent labs within the past 24 hours have been reviewed.  CBC:   Recent Labs   Lab 09/23/22  0848   WBC 9.40   HGB 15.7   HCT 47.7   PLT 93*     CMP:   Recent Labs   Lab 09/23/22  0937      K 3.7      CO2 27      BUN 9   CREATININE 0.8   CALCIUM 8.6*   ANIONGAP 11       Significant Imaging: I have reviewed all pertinent imaging results/findings within the past 24 hours.      Assessment/Plan:      * Closed 2-part intertrochanteric fracture of proximal end of left femur, initial encounter  Mechanical fall 9/22 approx 2200, fell to Left side, denies LOC or head trauma  Evidence of fx on xray  Ortho has evaluated patient    PLAN:  - NPO  - Cards clearance   - Recheck platelets  - surgery, per ortho  - PT/OT      AICD (automatic cardioverter/defibrillator) present  Cards to evaluate    PLAN:  - f/u post-op as needed      CHF (congestive heart failure)  Prior diagnosis in Care Everywhere. Endorses recent cough, sleeping sitting up. Currently SpO2 in mid90s on 2lpm. No signs of overload on exam. EF as of 9/23 - 35.  Prescribed Lasix 20 at home, does not take     PLAN:  - Cards recs  - Diurese prn        CAD (coronary artery disease)  S/p 3 stents after MI in Feb '22, s/p ICD 2007  States nonadherance to any medication    PLAN:  - Cardiology consult, f/u recs post-op  - restart prescribed meds (after surgery)            VTE Risk Mitigation (From admission, onward)         Ordered     IP VTE HIGH RISK PATIENT  Once         09/23/22 1120                Discharge Planning   FABIENNE:      Code Status: Partial Code   Is the patient medically ready for discharge?:     Reason for patient still in hospital (select all that apply): Patient trending condition  Discharge Plan A: Home Health                  Ha Landa MD  Department of Hospital Medicine    [facility] Surg

## 2022-09-25 LAB
ALBUMIN SERPL BCP-MCNC: 2.4 G/DL (ref 3.5–5.2)
ALP SERPL-CCNC: 63 U/L (ref 55–135)
ALT SERPL W/O P-5'-P-CCNC: 13 U/L (ref 10–44)
ANION GAP SERPL CALC-SCNC: 8 MMOL/L (ref 8–16)
ANION GAP SERPL CALC-SCNC: 9 MMOL/L (ref 8–16)
AST SERPL-CCNC: 21 U/L (ref 10–40)
BASOPHILS # BLD AUTO: 0.02 K/UL (ref 0–0.2)
BASOPHILS NFR BLD: 0.2 % (ref 0–1.9)
BILIRUB SERPL-MCNC: 1.2 MG/DL (ref 0.1–1)
BUN SERPL-MCNC: 18 MG/DL (ref 8–23)
BUN SERPL-MCNC: 19 MG/DL (ref 8–23)
CALCIUM SERPL-MCNC: 9 MG/DL (ref 8.7–10.5)
CALCIUM SERPL-MCNC: 9 MG/DL (ref 8.7–10.5)
CHLORIDE SERPL-SCNC: 98 MMOL/L (ref 95–110)
CHLORIDE SERPL-SCNC: 99 MMOL/L (ref 95–110)
CO2 SERPL-SCNC: 26 MMOL/L (ref 23–29)
CO2 SERPL-SCNC: 27 MMOL/L (ref 23–29)
CREAT SERPL-MCNC: 0.8 MG/DL (ref 0.5–1.4)
CREAT SERPL-MCNC: 0.8 MG/DL (ref 0.5–1.4)
DIFFERENTIAL METHOD: ABNORMAL
EOSINOPHIL # BLD AUTO: 0.1 K/UL (ref 0–0.5)
EOSINOPHIL NFR BLD: 0.9 % (ref 0–8)
ERYTHROCYTE [DISTWIDTH] IN BLOOD BY AUTOMATED COUNT: 14.6 % (ref 11.5–14.5)
EST. GFR  (NO RACE VARIABLE): >60 ML/MIN/1.73 M^2
EST. GFR  (NO RACE VARIABLE): >60 ML/MIN/1.73 M^2
GLUCOSE SERPL-MCNC: 118 MG/DL (ref 70–110)
GLUCOSE SERPL-MCNC: 153 MG/DL (ref 70–110)
HCT VFR BLD AUTO: 39.2 % (ref 40–54)
HGB BLD-MCNC: 12.8 G/DL (ref 14–18)
IMM GRANULOCYTES # BLD AUTO: 0.07 K/UL (ref 0–0.04)
IMM GRANULOCYTES NFR BLD AUTO: 0.9 % (ref 0–0.5)
LYMPHOCYTES # BLD AUTO: 1.1 K/UL (ref 1–4.8)
LYMPHOCYTES NFR BLD: 13 % (ref 18–48)
MAGNESIUM SERPL-MCNC: 1.9 MG/DL (ref 1.6–2.6)
MCH RBC QN AUTO: 33.4 PG (ref 27–31)
MCHC RBC AUTO-ENTMCNC: 32.7 G/DL (ref 32–36)
MCV RBC AUTO: 102 FL (ref 82–98)
MONOCYTES # BLD AUTO: 1 K/UL (ref 0.3–1)
MONOCYTES NFR BLD: 12 % (ref 4–15)
NEUTROPHILS # BLD AUTO: 6 K/UL (ref 1.8–7.7)
NEUTROPHILS NFR BLD: 73 % (ref 38–73)
NRBC BLD-RTO: 0 /100 WBC
PHOSPHATE SERPL-MCNC: 3.4 MG/DL (ref 2.7–4.5)
PLATELET # BLD AUTO: 78 K/UL (ref 150–450)
PMV BLD AUTO: 12.1 FL (ref 9.2–12.9)
POCT GLUCOSE: 136 MG/DL (ref 70–110)
POCT GLUCOSE: 202 MG/DL (ref 70–110)
POTASSIUM SERPL-SCNC: 3.8 MMOL/L (ref 3.5–5.1)
POTASSIUM SERPL-SCNC: 4.2 MMOL/L (ref 3.5–5.1)
PROT SERPL-MCNC: 6.1 G/DL (ref 6–8.4)
RBC # BLD AUTO: 3.83 M/UL (ref 4.6–6.2)
SODIUM SERPL-SCNC: 133 MMOL/L (ref 136–145)
SODIUM SERPL-SCNC: 134 MMOL/L (ref 136–145)
WBC # BLD AUTO: 8.16 K/UL (ref 3.9–12.7)

## 2022-09-25 PROCEDURE — 85025 COMPLETE CBC W/AUTO DIFF WBC: CPT

## 2022-09-25 PROCEDURE — 25000003 PHARM REV CODE 250: Performed by: STUDENT IN AN ORGANIZED HEALTH CARE EDUCATION/TRAINING PROGRAM

## 2022-09-25 PROCEDURE — 25000003 PHARM REV CODE 250: Performed by: NURSE PRACTITIONER

## 2022-09-25 PROCEDURE — 27000221 HC OXYGEN, UP TO 24 HOURS

## 2022-09-25 PROCEDURE — 63600175 PHARM REV CODE 636 W HCPCS

## 2022-09-25 PROCEDURE — 94640 AIRWAY INHALATION TREATMENT: CPT

## 2022-09-25 PROCEDURE — 97165 OT EVAL LOW COMPLEX 30 MIN: CPT

## 2022-09-25 PROCEDURE — 36415 COLL VENOUS BLD VENIPUNCTURE: CPT | Performed by: STUDENT IN AN ORGANIZED HEALTH CARE EDUCATION/TRAINING PROGRAM

## 2022-09-25 PROCEDURE — 84100 ASSAY OF PHOSPHORUS: CPT

## 2022-09-25 PROCEDURE — 97530 THERAPEUTIC ACTIVITIES: CPT | Performed by: PHYSICAL THERAPIST

## 2022-09-25 PROCEDURE — 63600175 PHARM REV CODE 636 W HCPCS: Performed by: STUDENT IN AN ORGANIZED HEALTH CARE EDUCATION/TRAINING PROGRAM

## 2022-09-25 PROCEDURE — 99900035 HC TECH TIME PER 15 MIN (STAT)

## 2022-09-25 PROCEDURE — 97162 PT EVAL MOD COMPLEX 30 MIN: CPT | Performed by: PHYSICAL THERAPIST

## 2022-09-25 PROCEDURE — 97530 THERAPEUTIC ACTIVITIES: CPT

## 2022-09-25 PROCEDURE — 36415 COLL VENOUS BLD VENIPUNCTURE: CPT

## 2022-09-25 PROCEDURE — 25000242 PHARM REV CODE 250 ALT 637 W/ HCPCS: Performed by: STUDENT IN AN ORGANIZED HEALTH CARE EDUCATION/TRAINING PROGRAM

## 2022-09-25 PROCEDURE — 83735 ASSAY OF MAGNESIUM: CPT

## 2022-09-25 PROCEDURE — 21400001 HC TELEMETRY ROOM

## 2022-09-25 PROCEDURE — 80048 BASIC METABOLIC PNL TOTAL CA: CPT | Mod: XB | Performed by: STUDENT IN AN ORGANIZED HEALTH CARE EDUCATION/TRAINING PROGRAM

## 2022-09-25 PROCEDURE — 80053 COMPREHEN METABOLIC PANEL: CPT

## 2022-09-25 RX ORDER — FUROSEMIDE 20 MG/1
20 TABLET ORAL 2 TIMES DAILY
Status: DISCONTINUED | OUTPATIENT
Start: 2022-09-26 | End: 2022-09-25

## 2022-09-25 RX ORDER — FUROSEMIDE 10 MG/ML
40 INJECTION INTRAMUSCULAR; INTRAVENOUS ONCE
Status: COMPLETED | OUTPATIENT
Start: 2022-09-25 | End: 2022-09-25

## 2022-09-25 RX ORDER — FUROSEMIDE 20 MG/1
20 TABLET ORAL 2 TIMES DAILY
Status: DISCONTINUED | OUTPATIENT
Start: 2022-09-25 | End: 2022-09-28 | Stop reason: HOSPADM

## 2022-09-25 RX ORDER — IPRATROPIUM BROMIDE AND ALBUTEROL SULFATE 2.5; .5 MG/3ML; MG/3ML
3 SOLUTION RESPIRATORY (INHALATION)
Status: DISCONTINUED | OUTPATIENT
Start: 2022-09-25 | End: 2022-09-28 | Stop reason: HOSPADM

## 2022-09-25 RX ADMIN — CEFAZOLIN SODIUM 1 G: 1 SOLUTION INTRAVENOUS at 02:09

## 2022-09-25 RX ADMIN — PRAVASTATIN SODIUM 40 MG: 40 TABLET ORAL at 08:09

## 2022-09-25 RX ADMIN — METOPROLOL TARTRATE 50 MG: 50 TABLET, FILM COATED ORAL at 08:09

## 2022-09-25 RX ADMIN — HYDROMORPHONE HYDROCHLORIDE 1 MG: 1 INJECTION, SOLUTION INTRAMUSCULAR; INTRAVENOUS; SUBCUTANEOUS at 10:09

## 2022-09-25 RX ADMIN — FUROSEMIDE 40 MG: 10 INJECTION, SOLUTION INTRAMUSCULAR; INTRAVENOUS at 09:09

## 2022-09-25 RX ADMIN — FUROSEMIDE 20 MG: 20 TABLET ORAL at 05:09

## 2022-09-25 RX ADMIN — IPRATROPIUM BROMIDE AND ALBUTEROL SULFATE 3 ML: 2.5; .5 SOLUTION RESPIRATORY (INHALATION) at 01:09

## 2022-09-25 RX ADMIN — HYDROMORPHONE HYDROCHLORIDE 1 MG: 1 INJECTION, SOLUTION INTRAMUSCULAR; INTRAVENOUS; SUBCUTANEOUS at 05:09

## 2022-09-25 RX ADMIN — MUPIROCIN: 20 OINTMENT TOPICAL at 09:09

## 2022-09-25 RX ADMIN — MUPIROCIN: 20 OINTMENT TOPICAL at 08:09

## 2022-09-25 RX ADMIN — IPRATROPIUM BROMIDE AND ALBUTEROL SULFATE 3 ML: 2.5; .5 SOLUTION RESPIRATORY (INHALATION) at 08:09

## 2022-09-25 RX ADMIN — CEFAZOLIN SODIUM 1 G: 1 SOLUTION INTRAVENOUS at 12:09

## 2022-09-25 RX ADMIN — ENOXAPARIN SODIUM 40 MG: 100 INJECTION SUBCUTANEOUS at 05:09

## 2022-09-25 RX ADMIN — ASPIRIN 81 MG: 81 TABLET, CHEWABLE ORAL at 08:09

## 2022-09-25 NOTE — ASSESSMENT & PLAN NOTE
S/p 3 stents after MI in Feb '22, s/p ICD 2007  States nonadherance to any medication  On ASA, statin, BB, ARB and ALdactone as an outpatient    PLAN:  - Cardiology consult. Appreciate recs  - BB and statin therapy resumed  -Home ARB and Aldactone still held as BP is normotensive while inpatient  -Lovenox started post-op

## 2022-09-25 NOTE — CARE UPDATE
Pt. Keeps taking O2 off in his sleep, states he doesn't remember taking off, placed back on O2 at 5L/M and added humidificaton

## 2022-09-25 NOTE — PROGRESS NOTES
[facility] Progress Note    72M left femoral neck fx s/p hemiarthroplasty 9/24/22    S: tolerated procedure yesterday without complication. NAEON. VSS. Single temp of 101.3 measured post op yesterday. Resting comfortably in bed with hip abduction pillow in place. C/o left hip soreness, pain overall well controlled on oral medication. Denies numbness or tingling. No subjective fever or chills. No SOB, CP. No other issues at this time.    O:   Vitals:    09/25/22 0841   BP: 118/73   Pulse: 74   Resp: (!) 24   Temp: 99.1 °F (37.3 °C)     Recent Labs     09/25/22  0601   WBC 8.16   HGB 12.8*   HCT 39.2*   PLT 78*     Recent Labs     09/25/22  0601   *   K 4.2   CL 99   CO2 26   BUN 18   *     No results for input(s): ESR, CRP in the last 72 hours.    Post OP films: interval placement of femoral prosthesis and cement mantle without complication     Exam:     Surgical dressing clean dry intact.  No saturation or strike through  No calf tenderness to palpation  No pain with passive stretch of toes  SILT Sa/Fenton/DP/SP/T  EHL/FHL/TA/GSC intact  Foot WWP, palpable DP     A/P: 72M S/P L hip hemiarthroplasty     - WBAT with posterior hip precautions  - Hip abduction pillow on at all times while in bed  - Physical therapy eval and treat   - Ancef 24 hr post op  - KIRBY/SCDs  - Multimodal pain control  - Ice pack therapy  - Recommend DVT chemoprophylaxis with lovenox while inpatient; ASA 81mg BID for 30 days upon discharge  - Clinic follow up 2 weeks with Dr. Woodruff   - Continue appropriate postop medical care per orders     Chico Sauceda MD

## 2022-09-25 NOTE — ASSESSMENT & PLAN NOTE
Mechanical fall 9/22 approx 2200, fell to Left side, denies LOC or head trauma  Evidence of fx on xray  Cardiac clearance given due to ICD placed in 2007    PLAN:  - Ortho consulted. S/p L hemiarthroplasty on 9/24.   -WBAT with posterior hip precautions  -Hip abduction pillow on at all times while in bed  - PT/OT  -Pain control

## 2022-09-25 NOTE — PROGRESS NOTES
[facility] Medicine  Progress Note    Patient Name: Ángel Curtis  MRN: [id number]  Patient Class: IP- Inpatient   Admission Date: 9/23/2022  Length of Stay: 2 days  Attending Physician: Ángel Leo MD  Primary Care Provider: Primary Doctor No        Subjective:     Principal Problem:Closed 2-part intertrochanteric fracture of proximal end of left femur, initial encounter        HPI:  73 y/o M w/pmhx of MI (Feb '22, 3 stents, 2 other MI in past), ICD (2007), CHF, HTN, COPD.   The patient lives at home with his wife. He suffered a mechanical fall on his way to the bathroom at 2200 on the night before presentation to Emergency. He fell to his left, denies LOC, palpitations, chest pain, SOB, fevers, head trauma. He complains of pain to his left hip. He denies other trauma. He notes a recent mild cough, which he attributes to smoking. The patient has been prescribed several medications for his background conditions, but states he has taken no medications at all for approximately one month. He smokes pack/day regularly and drinks approximately 5 shots of vodka per day. He denies illicit drug use.    ED course, 124/94, , 96.9F, SpO2 98% on 3lpm. No significant lab findings. CXR shows bilateral edema. An xray of the left hip showed an impacted varus angulated left femoral neck fracture. He was admitted to the Family Medicine in-patient service for medical management before and after orthopedic surgery.      Overview/Hospital Course:  Ortho consulted. Patient taken for L hip hemiarthoplasty on 9/24, with no significant complications. PT/OT post-op. Clinic follow up 2 weeks with Ortho Dr. Woodruff       Interval History: No acute events overnight. Patient with no complaints this AM, pain is well controlled. Denies any fever/chills, SOB, chest pain, dizziness/lightheadedness.     Oxygen requirement slowly increasing on 5L NC, will give Duoneb treatment and IV Lasix 40 and resume home Lasix tonight.      Review of Systems   Constitutional:  Negative for fatigue and fever.   Respiratory:  Negative for chest tightness and shortness of breath.    Cardiovascular:  Negative for chest pain, palpitations and leg swelling.   Gastrointestinal:  Negative for abdominal pain, constipation, diarrhea, nausea and vomiting.   Musculoskeletal:  Positive for arthralgias.        L hip   Neurological:  Negative for dizziness, syncope, weakness, light-headedness, numbness and headaches.   Objective:     Vital Signs (Most Recent):  Temp: 98.6 °F (37 °C) (09/25/22 0425)  Pulse: 81 (09/25/22 0425)  Resp: (!) 22 (09/25/22 0510)  BP: 132/71 (09/25/22 0425)  SpO2: (!) 92 % (09/25/22 0444)   Vital Signs (24h Range):  Temp:  [98.1 °F (36.7 °C)-101.3 °F (38.5 °C)] 98.6 °F (37 °C)  Pulse:  [60-92] 81  Resp:  [16-22] 22  SpO2:  [82 %-100 %] 92 %  BP: (117-139)/(67-79) 132/71     Weight: 80.7 kg (178 lb)  Body mass index is 25.54 kg/m².    Intake/Output Summary (Last 24 hours) at 9/25/2022 0822  Last data filed at 9/25/2022 0600  Gross per 24 hour   Intake 350 ml   Output 450 ml   Net -100 ml      Physical Exam  Constitutional:       Appearance: Normal appearance.   HENT:      Head: Normocephalic.      Mouth/Throat:      Mouth: Mucous membranes are moist.   Eyes:      Extraocular Movements: Extraocular movements intact.      Pupils: Pupils are equal, round, and reactive to light.   Cardiovascular:      Rate and Rhythm: Rhythm irregular.      Pulses: Normal pulses.      Heart sounds: Normal heart sounds.   Pulmonary:      Effort: Pulmonary effort is normal.      Breath sounds: Rhonchi and rales present.   Abdominal:      General: Bowel sounds are normal. There is distension.      Tenderness: There is no abdominal tenderness. There is no guarding.   Musculoskeletal:      Cervical back: Normal range of motion and neck supple. No tenderness.      Comments: S/p L hip hemiarthoplasty, bandage clean, dry, intact   Skin:     General: Skin is warm.       Coloration: Skin is not pale.   Neurological:      General: No focal deficit present.      Mental Status: He is alert and oriented to person, place, and time.      Cranial Nerves: No cranial nerve deficit.      Sensory: No sensory deficit.       Significant Labs: All pertinent labs within the past 24 hours have been reviewed.    Significant Imaging: I have reviewed all pertinent imaging results/findings within the past 24 hours.      Assessment/Plan:      * Closed 2-part intertrochanteric fracture of proximal end of left femur, initial encounter  Mechanical fall 9/22 approx 2200, fell to Left side, denies LOC or head trauma  Evidence of fx on xray  Cardiac clearance given due to ICD placed in 2007    PLAN:  - Ortho consulted. S/p L hemiarthroplasty on 9/24.   -WBAT with posterior hip precautions  -Hip abduction pillow on at all times while in bed  - PT/OT  -Pain control       AICD (automatic cardioverter/defibrillator) present  Cards to evaluate    PLAN:  - f/u post-op as needed      CHF (congestive heart failure)  Prior diagnosis in Care Everywhere. Endorses recent cough, sleeping sitting up. Currently SpO2 in mid90s on 2lpm. No signs of overload on exam. EF as of 9/23 - 35.  Prescribed Lasix 20 at home, does not take     PLAN:  - Cards recs  - IV Lasix 40 mg x 1 today  -Will resume home dose of Lasix 20 BID today        CAD (coronary artery disease)  S/p 3 stents after MI in Feb '22, s/p ICD 2007  States nonadherance to any medication  On ASA, statin, BB, ARB and ALdactone as an outpatient    PLAN:  - Cardiology consult. Appreciate recs  - BB and statin therapy resumed  -Home ARB and Aldactone still held as BP is normotensive while inpatient  -Lovenox started post-op              VTE Risk Mitigation (From admission, onward)         Ordered     enoxaparin injection 40 mg  Daily         09/24/22 1409     IP VTE HIGH RISK PATIENT  Once         09/23/22 1120                Discharge Planning   FABIENNE:      Code Status:  Partial Code   Is the patient medically ready for discharge?:     Reason for patient still in hospital (select all that apply): Consult recommendations, PT / OT recommendations and Pending disposition  Discharge Plan A: Home Health          Kely Anton MD  [facility] Family Medicine, PGY-1  09/25/2022

## 2022-09-25 NOTE — PLAN OF CARE
Pt is AAOX4. Scheduled meds were given per MAR. No C/O N/V or pain. Pt rested well, family is present at the bedside. Bed in lowest position, call light within reach. Nurse will continue to monitor.   Problem: Infection  Goal: Absence of Infection Signs and Symptoms  Outcome: Ongoing, Progressing     Problem: Fall Injury Risk  Goal: Absence of Fall and Fall-Related Injury  Outcome: Ongoing, Progressing     Problem: COPD (Chronic Obstructive Pulmonary Disease) Comorbidity  Goal: Maintenance of COPD Symptom Control  Outcome: Ongoing, Progressing     Problem: Heart Failure Comorbidity  Goal: Maintenance of Heart Failure Symptom Control  Outcome: Ongoing, Progressing     Problem: Adjustment to Injury (Hip Fracture Medical Management)  Goal: Optimal Coping with Change in Health Status  Outcome: Ongoing, Progressing     Problem: Bleeding (Hip Fracture Medical Management)  Goal: Absence of Bleeding  Outcome: Ongoing, Progressing

## 2022-09-25 NOTE — PT/OT/SLP EVAL
Physical Therapy Evaluation    Patient Name:  Ángel Curtis   MRN:  [id number]    Recommendations:     Discharge Recommendations:  nursing facility, skilled   Discharge Equipment Recommendations:  (defer)   Barriers to discharge: Decreased caregiver support and decreased functional mobility tolerance    Assessment:     Ángel Curtis is a 72 y.o. male admitted with a medical diagnosis of Closed 2-part intertrochanteric fracture of proximal end of left femur, initial encounter.  He presents with the following impairments/functional limitations:  weakness, impaired endurance, gait instability, impaired cardiopulmonary response to activity, impaired balance, decreased lower extremity function, impaired self care skills, pain, impaired functional mobility. Pt required Max assist of 2 for supine to/from sit.  Pt required Mod assist of 2 for sit to stand with RW with WBAT LLE.  Ambulation not possible 2/2 high pain level.      Rehab Prognosis: Good; patient would benefit from acute skilled PT services to address these deficits and reach maximum level of function.    Recent Surgery: Procedure(s) (LRB):  HEMIARTHROPLASTY, HIP (Left) 1 Day Post-Op    Plan:     During this hospitalization, patient to be seen daily to address the identified rehab impairments via gait training, therapeutic activities, therapeutic exercises, neuromuscular re-education and progress toward the following goals:    Plan of Care Expires:  10/20/22    Subjective     Chief Complaint: left hip pain  Patient/Family Comments/goals: get better  Pain/Comfort:       Patients cultural, spiritual, Buddhist conflicts given the current situation: no    Living Environment:  Pt lives with his wife in a [facility] with THE.    Prior to admission, patients level of function was Ind community ambulation, reporting several falls in last 2 years.  Equipment used at home: grab bar, shower chair, bedside commode, walker, rolling, rollator.  DME owned (not currently used): none.   Upon discharge, patient will have assistance from his wife.    Objective:     Communicated with nurse prior to session.  Patient found HOB elevated with bed alarm, hip abduction pillow, telemetry, peripheral IV, payton catheter  upon PT entry to room.    General Precautions: Standard, fall   Orthopedic Precautions:LLE weight bearing as tolerated   Braces:    Respiratory Status: Nasal cannula, flow 5 L/min    Exams:  Pt is grossly oriented x 4.  Pt has abd pillow donned.  Pt has RLE AROM WFL.  Pt has 4 to 5/5 RLE and left knee/ankle strength.    Functional Mobility:  Pt required Max assist of 2 for supine to/from sit.  Pt required Mod assist of 2 for sit to stand with RW with WBAT LLE.  Ambulation not possible 2/2 high pain level.        AM-PAC 6 CLICK MOBILITY  Total Score:9     Patient left HOB elevated with all lines intact.    GOALS:   Multidisciplinary Problems       Physical Therapy Goals          Problem: Physical Therapy    Goal Priority Disciplines Outcome Goal Variances Interventions   Physical Therapy Goal     PT, PT/OT Ongoing, Progressing     Description: Goals to be met by: 10/10/22    Patient will increase functional independence with mobility by performin.  Supine to/from sit with CG of 1  2.  Bed to/from chair with RW with CG of 1 with WBAT LLE  3.  Up in chair 1 hour  4.  Ambulate 75' with RW with CG of 1 with WBAT LLE  5.  Knowledge of hip precautions                         History:     No past medical history on file.      Time Tracking:     PT Received On: 22  PT Start Time: 935     PT Stop Time: 1002  PT Total Time (min): 27 min with OT    Billable Minutes: Evaluation 15 and Therapeutic Activity 12      2022

## 2022-09-25 NOTE — PT/OT/SLP EVAL
Occupational Therapy   Evaluation/tx    Name: Ángel Curtis  MRN: [id number]  Admitting Diagnosis:  Closed 2-part intertrochanteric fracture of proximal end of left femur, initial encounter  Recent Surgery: Procedure(s) (LRB):  HEMIARTHROPLASTY, HIP (Left) 1 Day Post-Op    Recommendations:     Discharge Recommendations: nursing facility, skilled  Discharge Equipment Recommendations:  other (see comments) (defer)  Barriers to discharge:  None    Assessment:    Pt presents w/ decreased overall endurance/conditioning, balance/mobility & coordination w/ subsequent decline in (I)/safety w/ BADLs, fxnl mobility & fxnl t/f's.  OT 5x/wk to increase phys/fxnl status & maximize potential to achieve established goals for d/c-->SNF w/ DME deferred.    Ángel Curtis is a 72 y.o. male with a medical diagnosis of Closed 2-part intertrochanteric fracture of proximal end of left femur, initial encounter.  He presents with . Performance deficits affecting function: weakness, impaired endurance, impaired self care skills, impaired functional mobility, decreased coordination, impaired balance, gait instability, decreased lower extremity function, decreased safety awareness, pain, decreased ROM, impaired skin, edema, orthopedic precautions, impaired cardiopulmonary response to activity.      Rehab Prognosis: Good; patient would benefit from acute skilled OT services to address these deficits and reach maximum level of function.       Plan:     Patient to be seen 5 x/week to address the above listed problems via self-care/home management, therapeutic activities, therapeutic exercises  Plan of Care Expires: 10/25/22  Plan of Care Reviewed with: patient, spouse    Subjective     Chief Complaint: fall  Patient/Family Comments/goals: return home    Occupational Profile:  Living Environment: w/ spouse in SSH w/ THE; WIS w/ GB & shower chair  Previous level of function: (I)  Roles and Routines: driving  Equipment Used at Home:  grab bar, shower  chair, bedside commode, walker, rolling, rollator, wheelchair  Assistance upon Discharge: limited    Pain/Comfort:  Pain Rating 1: 0/10  Location - Side 1: Left  Location 1: hip  Pain Addressed 1: Reposition, Distraction, Cessation of Activity, Nurse notified  Pain Rating Post-Intervention 1: 10/10    Patients cultural, spiritual, Confucianist conflicts given the current situation:      Objective:     Communicated with: nsg prior to session.  Patient found HOB elevated with bed alarm, hip abduction pillow, telemetry, peripheral IV, payton catheter upon OT entry to room.    General Precautions: Standard, fall   Orthopedic Precautions:LLE weight bearing as tolerated, LLE posterior precautions   Braces: N/A  Respiratory Status: Nasal cannula, flow 5 L/min    Occupational Performance:    Bed Mobility:    Patient completed Supine to Sit with maximal assistance, 2 persons, and with side rail  Patient completed Sit to Supine with maximal assistance, 2 persons, and with side rail    Functional Mobility/Transfers:  Patient completed Sit <> Stand Transfer with moderate assistance and of 2 persons  with  rolling walker   Functional Mobility: scooting laterally L at EOB w/ Max A x 2    Activities of Daily Living:  Lower Body Dressing: maximal assistance don socks    Cognitive/Visual Perceptual:  AO4    No signif deficits noted    Physical Exam:  BUEs grossly WFL    Sit balance: F- to F  Stand balance: F- w/ RW    AMPAC 6 Click ADL:  AMPAC Total Score: 13    Treatment & Education:   Pt found in SF w/ spouse in room & agreeable to OT/PT co-eval/tx this date.  Pt w/o c/o pain at rest & perf the following:  -sup-->EOB w/ bed rail & Max A x 2; tolerated x ~5min w/ CGA  -standing via RW w/ EOB elevated partially w/ Mod A x 2  -pt unable to sidestep  -scooting laterally to L at EOB w/ Max A x 2  -return to supine w/ Max A x 2  Edu/tx re: post hip precautions, general safety techs & HEP. Pt/spouse verbalized understanding.      Patient  left HOB elevated with all lines intact, call button in reach, bed alarm on, nsg notified, and spouse present    GOALS:   Multidisciplinary Problems       Occupational Therapy Goals          Problem: Occupational Therapy    Goal Priority Disciplines Outcome Interventions   Occupational Therapy Goal     OT, PT/OT Ongoing, Progressing    Description: Goals to be met by: 10/25     Patient will increase functional independence with ADLs by performing:    LE Dressing with Minimal Assistance and Assistive Devices as needed.  Grooming while standing at sink with Stand-by Assistance and Contact Guard Assistance.  Toileting from bedside commode with Minimal Assistance for hygiene and clothing management.   Toilet transfer to bedside commode with Minimal Assistance.  Increased functional strength to WFL for ADLs.                         History:     No past medical history on file.    No past surgical history on file.    Time Tracking:     OT Date of Treatment: 09/25/22  OT Start Time: 0935  OT Stop Time: 1002  OT Total Time (min): 27 min    Billable Minutes:Evaluation 10  Therapeutic Activity 17  Total Time 27    9/25/2022

## 2022-09-25 NOTE — ASSESSMENT & PLAN NOTE
Prior diagnosis in Care Everywhere. Endorses recent cough, sleeping sitting up. Currently SpO2 in mid90s on 2lpm. No signs of overload on exam. EF as of 9/23 - 35.  Prescribed Lasix 20 at home, does not take     PLAN:  - Cards recs  - IV Lasix 40 mg x 1 today  -Will resume home dose of Lasix 20 BID today

## 2022-09-25 NOTE — NURSING
Continuous pulse ox alarming. Patient oxygen off, patient sleeping. Oxygen saturation reading 80%.oxygen placed back on patient and saturation increased to 92% at 5L on nasal cannula

## 2022-09-25 NOTE — SUBJECTIVE & OBJECTIVE
Interval History: No acute events overnight. Patient with no complaints this AM, pain is well controlled. Denies any fever/chills, SOB, chest pain, dizziness/lightheadedness.     Oxygen requirement slowly increasing on 5L NC, will give Duoneb treatment and IV Lasix 40 and resume home Lasix tonight.     Review of Systems   Constitutional:  Negative for fatigue and fever.   Respiratory:  Negative for chest tightness and shortness of breath.    Cardiovascular:  Negative for chest pain, palpitations and leg swelling.   Gastrointestinal:  Negative for abdominal pain, constipation, diarrhea, nausea and vomiting.   Musculoskeletal:  Positive for arthralgias.        L hip   Neurological:  Negative for dizziness, syncope, weakness, light-headedness, numbness and headaches.   Objective:     Vital Signs (Most Recent):  Temp: 98.6 °F (37 °C) (09/25/22 0425)  Pulse: 81 (09/25/22 0425)  Resp: (!) 22 (09/25/22 0510)  BP: 132/71 (09/25/22 0425)  SpO2: (!) 92 % (09/25/22 0444)   Vital Signs (24h Range):  Temp:  [98.1 °F (36.7 °C)-101.3 °F (38.5 °C)] 98.6 °F (37 °C)  Pulse:  [60-92] 81  Resp:  [16-22] 22  SpO2:  [82 %-100 %] 92 %  BP: (117-139)/(67-79) 132/71     Weight: 80.7 kg (178 lb)  Body mass index is 25.54 kg/m².    Intake/Output Summary (Last 24 hours) at 9/25/2022 0822  Last data filed at 9/25/2022 0600  Gross per 24 hour   Intake 350 ml   Output 450 ml   Net -100 ml      Physical Exam  Constitutional:       Appearance: Normal appearance.   HENT:      Head: Normocephalic.      Mouth/Throat:      Mouth: Mucous membranes are moist.   Eyes:      Extraocular Movements: Extraocular movements intact.      Pupils: Pupils are equal, round, and reactive to light.   Cardiovascular:      Rate and Rhythm: Rhythm irregular.      Pulses: Normal pulses.      Heart sounds: Normal heart sounds.   Pulmonary:      Effort: Pulmonary effort is normal.      Breath sounds: Rhonchi and rales present.   Abdominal:      General: Bowel sounds are  normal. There is distension.      Tenderness: There is no abdominal tenderness. There is no guarding.   Musculoskeletal:      Cervical back: Normal range of motion and neck supple. No tenderness.      Comments: S/p L hip hemiarthoplasty, bandage clean, dry, intact   Skin:     General: Skin is warm.      Coloration: Skin is not pale.   Neurological:      General: No focal deficit present.      Mental Status: He is alert and oriented to person, place, and time.      Cranial Nerves: No cranial nerve deficit.      Sensory: No sensory deficit.       Significant Labs: All pertinent labs within the past 24 hours have been reviewed.    Significant Imaging: I have reviewed all pertinent imaging results/findings within the past 24 hours.

## 2022-09-25 NOTE — PLAN OF CARE
Problem: Occupational Therapy  Goal: Occupational Therapy Goal  Description: Goals to be met by: 10/25     Patient will increase functional independence with ADLs by performing:    LE Dressing with Minimal Assistance and Assistive Devices as needed.  Grooming while standing at sink with Stand-by Assistance and Contact Guard Assistance.  Toileting from bedside commode with Minimal Assistance for hygiene and clothing management.   Toilet transfer to bedside commode with Minimal Assistance.  Increased functional strength to WFL for ADLs.    Outcome: Ongoing, Progressing   Pt found in SF w/ spouse in room & agreeable to OT/PT co-eval/tx this date.  Pt w/o c/o pain at rest & perf the following:  -sup-->EOB w/ bed rail & Max A x 2; tolerated x ~5min w/ CGA  -standing via RW w/ EOB elevated partially w/ Mod A x 2  -pt unable to sidestep  -scooting laterally to L at EOB w/ Max A x 2  -return to supine w/ Max A x 2  Edu/tx re: post hip precautions, general safety techs & HEP. Pt/spouse verbalized understanding.    Pt presents w/ decreased overall endurance/conditioning, balance/mobility & coordination w/ subsequent decline in (I)/safety w/ BADLs, fxnl mobility & fxnl t/f's.  OT 5x/wk to increase phys/fxnl status & maximize potential to achieve established goals for d/c-->SNF w/ DME deferred.

## 2022-09-25 NOTE — PLAN OF CARE
Problem: Physical Therapy  Goal: Physical Therapy Goal  Description: Goals to be met by: 10/10/22    Patient will increase functional independence with mobility by performin.  Supine to/from sit with CG of 1  2.  Bed to/from chair with RW with CG of 1 with WBAT LLE  3.  Up in chair 1 hour  4.  Ambulate 75' with RW with CG of 1 with WBAT LLE  5.  Knowledge of hip precautions    Outcome: Ongoing, Progressing

## 2022-09-26 ENCOUNTER — PATIENT OUTREACH (OUTPATIENT)
Dept: ADMINISTRATIVE | Facility: OTHER | Age: 72
End: 2022-09-26
Payer: MEDICARE

## 2022-09-26 PROBLEM — I50.23 ACUTE ON CHRONIC SYSTOLIC CONGESTIVE HEART FAILURE: Status: ACTIVE | Noted: 2022-09-23

## 2022-09-26 LAB
ALBUMIN SERPL BCP-MCNC: 2.2 G/DL (ref 3.5–5.2)
ALP SERPL-CCNC: 66 U/L (ref 55–135)
ALT SERPL W/O P-5'-P-CCNC: 10 U/L (ref 10–44)
ANION GAP SERPL CALC-SCNC: 12 MMOL/L (ref 8–16)
AST SERPL-CCNC: 25 U/L (ref 10–40)
BASOPHILS # BLD AUTO: 0.01 K/UL (ref 0–0.2)
BASOPHILS NFR BLD: 0.1 % (ref 0–1.9)
BILIRUB SERPL-MCNC: 1.3 MG/DL (ref 0.1–1)
BUN SERPL-MCNC: 17 MG/DL (ref 8–23)
CALCIUM SERPL-MCNC: 9 MG/DL (ref 8.7–10.5)
CHLORIDE SERPL-SCNC: 98 MMOL/L (ref 95–110)
CO2 SERPL-SCNC: 26 MMOL/L (ref 23–29)
CREAT SERPL-MCNC: 0.7 MG/DL (ref 0.5–1.4)
DIFFERENTIAL METHOD: ABNORMAL
EOSINOPHIL # BLD AUTO: 0.1 K/UL (ref 0–0.5)
EOSINOPHIL NFR BLD: 0.8 % (ref 0–8)
ERYTHROCYTE [DISTWIDTH] IN BLOOD BY AUTOMATED COUNT: 14.4 % (ref 11.5–14.5)
EST. GFR  (NO RACE VARIABLE): >60 ML/MIN/1.73 M^2
GLUCOSE SERPL-MCNC: 104 MG/DL (ref 70–110)
HCT VFR BLD AUTO: 36.3 % (ref 40–54)
HGB BLD-MCNC: 12 G/DL (ref 14–18)
IMM GRANULOCYTES # BLD AUTO: 0.03 K/UL (ref 0–0.04)
IMM GRANULOCYTES NFR BLD AUTO: 0.4 % (ref 0–0.5)
LYMPHOCYTES # BLD AUTO: 1.3 K/UL (ref 1–4.8)
LYMPHOCYTES NFR BLD: 17.3 % (ref 18–48)
MAGNESIUM SERPL-MCNC: 1.8 MG/DL (ref 1.6–2.6)
MCH RBC QN AUTO: 34 PG (ref 27–31)
MCHC RBC AUTO-ENTMCNC: 33.1 G/DL (ref 32–36)
MCV RBC AUTO: 103 FL (ref 82–98)
MONOCYTES # BLD AUTO: 1.1 K/UL (ref 0.3–1)
MONOCYTES NFR BLD: 15.3 % (ref 4–15)
NEUTROPHILS # BLD AUTO: 4.8 K/UL (ref 1.8–7.7)
NEUTROPHILS NFR BLD: 66.1 % (ref 38–73)
NRBC BLD-RTO: 0 /100 WBC
PHOSPHATE SERPL-MCNC: 3 MG/DL (ref 2.7–4.5)
PLATELET # BLD AUTO: 75 K/UL (ref 150–450)
PMV BLD AUTO: 12.2 FL (ref 9.2–12.9)
POCT GLUCOSE: 247 MG/DL (ref 70–110)
POTASSIUM SERPL-SCNC: 3.6 MMOL/L (ref 3.5–5.1)
PROT SERPL-MCNC: 6.1 G/DL (ref 6–8.4)
RBC # BLD AUTO: 3.53 M/UL (ref 4.6–6.2)
SODIUM SERPL-SCNC: 136 MMOL/L (ref 136–145)
WBC # BLD AUTO: 7.27 K/UL (ref 3.9–12.7)

## 2022-09-26 PROCEDURE — 21400001 HC TELEMETRY ROOM

## 2022-09-26 PROCEDURE — 25000003 PHARM REV CODE 250: Performed by: STUDENT IN AN ORGANIZED HEALTH CARE EDUCATION/TRAINING PROGRAM

## 2022-09-26 PROCEDURE — 63600175 PHARM REV CODE 636 W HCPCS: Performed by: STUDENT IN AN ORGANIZED HEALTH CARE EDUCATION/TRAINING PROGRAM

## 2022-09-26 PROCEDURE — 94799 UNLISTED PULMONARY SVC/PX: CPT

## 2022-09-26 PROCEDURE — 25000003 PHARM REV CODE 250: Performed by: NURSE PRACTITIONER

## 2022-09-26 PROCEDURE — 83735 ASSAY OF MAGNESIUM: CPT

## 2022-09-26 PROCEDURE — 84100 ASSAY OF PHOSPHORUS: CPT

## 2022-09-26 PROCEDURE — 97530 THERAPEUTIC ACTIVITIES: CPT

## 2022-09-26 PROCEDURE — 25000242 PHARM REV CODE 250 ALT 637 W/ HCPCS: Performed by: STUDENT IN AN ORGANIZED HEALTH CARE EDUCATION/TRAINING PROGRAM

## 2022-09-26 PROCEDURE — 94640 AIRWAY INHALATION TREATMENT: CPT

## 2022-09-26 PROCEDURE — 63600175 PHARM REV CODE 636 W HCPCS

## 2022-09-26 PROCEDURE — 97110 THERAPEUTIC EXERCISES: CPT

## 2022-09-26 PROCEDURE — 25000242 PHARM REV CODE 250 ALT 637 W/ HCPCS

## 2022-09-26 PROCEDURE — 80053 COMPREHEN METABOLIC PANEL: CPT

## 2022-09-26 PROCEDURE — 36415 COLL VENOUS BLD VENIPUNCTURE: CPT

## 2022-09-26 PROCEDURE — 27000221 HC OXYGEN, UP TO 24 HOURS

## 2022-09-26 PROCEDURE — 99900035 HC TECH TIME PER 15 MIN (STAT)

## 2022-09-26 PROCEDURE — 85025 COMPLETE CBC W/AUTO DIFF WBC: CPT

## 2022-09-26 RX ORDER — MAGNESIUM SULFATE 1 G/100ML
1 INJECTION INTRAVENOUS ONCE
Status: COMPLETED | OUTPATIENT
Start: 2022-09-26 | End: 2022-09-26

## 2022-09-26 RX ORDER — FLUTICASONE FUROATE AND VILANTEROL 100; 25 UG/1; UG/1
1 POWDER RESPIRATORY (INHALATION) DAILY
Status: DISCONTINUED | OUTPATIENT
Start: 2022-09-26 | End: 2022-09-28 | Stop reason: HOSPADM

## 2022-09-26 RX ORDER — OXYCODONE AND ACETAMINOPHEN 5; 325 MG/1; MG/1
1 TABLET ORAL EVERY 6 HOURS PRN
Status: DISCONTINUED | OUTPATIENT
Start: 2022-09-26 | End: 2022-09-28 | Stop reason: HOSPADM

## 2022-09-26 RX ORDER — ALBUTEROL SULFATE 90 UG/1
2 AEROSOL, METERED RESPIRATORY (INHALATION) EVERY 6 HOURS PRN
Status: DISCONTINUED | OUTPATIENT
Start: 2022-09-26 | End: 2022-09-28 | Stop reason: HOSPADM

## 2022-09-26 RX ORDER — FUROSEMIDE 10 MG/ML
20 INJECTION INTRAMUSCULAR; INTRAVENOUS ONCE
Status: COMPLETED | OUTPATIENT
Start: 2022-09-26 | End: 2022-09-26

## 2022-09-26 RX ADMIN — IPRATROPIUM BROMIDE AND ALBUTEROL SULFATE 3 ML: 2.5; .5 SOLUTION RESPIRATORY (INHALATION) at 07:09

## 2022-09-26 RX ADMIN — OXYCODONE HYDROCHLORIDE AND ACETAMINOPHEN 1 TABLET: 5; 325 TABLET ORAL at 08:09

## 2022-09-26 RX ADMIN — MAGNESIUM SULFATE 1 G: 1 INJECTION INTRAVENOUS at 09:09

## 2022-09-26 RX ADMIN — IPRATROPIUM BROMIDE AND ALBUTEROL SULFATE 3 ML: 2.5; .5 SOLUTION RESPIRATORY (INHALATION) at 08:09

## 2022-09-26 RX ADMIN — MUPIROCIN: 20 OINTMENT TOPICAL at 09:09

## 2022-09-26 RX ADMIN — FUROSEMIDE 20 MG: 20 TABLET ORAL at 08:09

## 2022-09-26 RX ADMIN — METOPROLOL TARTRATE 50 MG: 50 TABLET, FILM COATED ORAL at 08:09

## 2022-09-26 RX ADMIN — FUROSEMIDE 20 MG: 20 TABLET ORAL at 06:09

## 2022-09-26 RX ADMIN — OXYCODONE HYDROCHLORIDE AND ACETAMINOPHEN 1 TABLET: 5; 325 TABLET ORAL at 03:09

## 2022-09-26 RX ADMIN — ENOXAPARIN SODIUM 40 MG: 100 INJECTION SUBCUTANEOUS at 06:09

## 2022-09-26 RX ADMIN — PRAVASTATIN SODIUM 40 MG: 40 TABLET ORAL at 08:09

## 2022-09-26 RX ADMIN — FUROSEMIDE 20 MG: 10 INJECTION, SOLUTION INTRAMUSCULAR; INTRAVENOUS at 09:09

## 2022-09-26 RX ADMIN — MUPIROCIN: 20 OINTMENT TOPICAL at 08:09

## 2022-09-26 RX ADMIN — FLUTICASONE FUROATE AND VILANTEROL TRIFENATATE 1 PUFF: 100; 25 POWDER RESPIRATORY (INHALATION) at 01:09

## 2022-09-26 RX ADMIN — MAGNESIUM SULFATE 1 G: 1 INJECTION INTRAVENOUS at 08:09

## 2022-09-26 RX ADMIN — IPRATROPIUM BROMIDE AND ALBUTEROL SULFATE 3 ML: 2.5; .5 SOLUTION RESPIRATORY (INHALATION) at 01:09

## 2022-09-26 RX ADMIN — ASPIRIN 81 MG: 81 TABLET, CHEWABLE ORAL at 08:09

## 2022-09-26 NOTE — PLAN OF CARE
Problem: Physical Therapy  Goal: Physical Therapy Goal  Description: Goals to be met by: 10/10/22    Patient will increase functional independence with mobility by performin.  Supine to/from sit with CG of 1  2.  Bed to/from chair with RW with CG of 1 with WBAT LLE  3.  Up in chair 1 hour  4.  Ambulate 75' with RW with CG of 1 with WBAT LLE  5.  Knowledge of hip precautions    Outcome: Ongoing, Progressing     Pt completed 2 stands with mod A with RW and side steps R with RW and mod A, pt deferring forward steps 2/2 LLE pain. Recommending post acute placement upon d/c.

## 2022-09-26 NOTE — NURSING
"Explain to patient that the [place] catheter has to be taken out, patient states, "I am still very weak.  I don't want it to be taken out."  MD notified and OK to keep another day.  Will continue to monitor.  "

## 2022-09-26 NOTE — SUBJECTIVE & OBJECTIVE
Interval History:   NAEON. VSSAF on 5lpm. No complaints. Working with PT/OT. Resuming home meds.    Review of Systems   Constitutional:  Negative for fatigue and fever.   Respiratory:  Negative for chest tightness and shortness of breath.    Cardiovascular:  Negative for chest pain, palpitations and leg swelling.   Gastrointestinal:  Negative for abdominal pain, constipation, diarrhea, nausea and vomiting.   Musculoskeletal:  Positive for arthralgias.        L hip   Neurological:  Negative for dizziness, syncope, weakness, light-headedness, numbness and headaches.     Objective:     Vital Signs (Most Recent):  Temp: 98.3 °F (36.8 °C) (09/25/22 2325)  Pulse: 88 (09/26/22 0733)  Resp: 16 (09/26/22 0733)  BP: 128/71 (09/25/22 2325)  SpO2: 96 % (09/26/22 0733) Vital Signs (24h Range):  Temp:  [98.3 °F (36.8 °C)-99.1 °F (37.3 °C)] 98.3 °F (36.8 °C)  Pulse:  [53-89] 88  Resp:  [16-26] 16  SpO2:  [72 %-99 %] 96 %  BP: (117-132)/(57-75) 128/71     Weight: 80.7 kg (177 lb 14.6 oz)  Body mass index is 25.53 kg/m².    Intake/Output Summary (Last 24 hours) at 9/26/2022 0744  Last data filed at 9/26/2022 0700  Gross per 24 hour   Intake 157.48 ml   Output 1950 ml   Net -1792.52 ml      Physical Exam  Constitutional:       Appearance: Normal appearance.   HENT:      Head: Normocephalic.      Mouth/Throat:      Mouth: Mucous membranes are moist.   Eyes:      Extraocular Movements: Extraocular movements intact.      Pupils: Pupils are equal, round, and reactive to light.   Cardiovascular:      Rate and Rhythm: Rhythm irregular.      Pulses: Normal pulses.      Heart sounds: Normal heart sounds.   Pulmonary:      Effort: Pulmonary effort is normal.      Breath sounds: Rhonchi and rales present.   Abdominal:      General: Bowel sounds are normal. There is distension.      Tenderness: There is no abdominal tenderness. There is no guarding.   Musculoskeletal:      Cervical back: Normal range of motion and neck supple. No tenderness.       Comments: S/p L hip hemiarthoplasty, bandage clean, dry, intact   Skin:     General: Skin is warm.      Coloration: Skin is not pale.   Neurological:      General: No focal deficit present.      Mental Status: He is alert and oriented to person, place, and time.      Cranial Nerves: No cranial nerve deficit.      Sensory: No sensory deficit.       Significant Labs: All pertinent labs within the past 24 hours have been reviewed.  CBC:   Recent Labs   Lab 09/25/22  0601 09/26/22  0453   WBC 8.16 7.27   HGB 12.8* 12.0*   HCT 39.2* 36.3*   PLT 78* 75*     CMP:   Recent Labs   Lab 09/25/22  0601 09/25/22  1556 09/26/22  0453   * 133* 136   K 4.2 3.8 3.6   CL 99 98 98   CO2 26 27 26   * 153* 104   BUN 18 19 17   CREATININE 0.8 0.8 0.7   CALCIUM 9.0 9.0 9.0   PROT 6.1  --  6.1   ALBUMIN 2.4*  --  2.2*   BILITOT 1.2*  --  1.3*   ALKPHOS 63  --  66   AST 21  --  25   ALT 13  --  10   ANIONGAP 9 8 12       Significant Imaging: I have reviewed all pertinent imaging results/findings within the past 24 hours.

## 2022-09-26 NOTE — PROGRESS NOTES
[facility] Medicine  Progress Note    Patient Name: Ángel Curtis  MRN: [id number]  Patient Class: IP- Inpatient   Admission Date: 9/23/2022  Length of Stay: 3 days  Attending Physician: Ángel Leo MD  Primary Care Provider: Primary Doctor No        Subjective:     Principal Problem:Closed 2-part intertrochanteric fracture of proximal end of left femur, initial encounter        HPI:  71 y/o M w/pmhx of MI (Feb '22, 3 stents, 2 other MI in past), ICD (2007), CHF, HTN, COPD.   The patient lives at home with his wife. He suffered a mechanical fall on his way to the bathroom at 2200 on the night before presentation to Emergency. He fell to his left, denies LOC, palpitations, chest pain, SOB, fevers, head trauma. He complains of pain to his left hip. He denies other trauma. He notes a recent mild cough, which he attributes to smoking. The patient has been prescribed several medications for his background conditions, but states he has taken no medications at all for approximately one month. He smokes pack/day regularly and drinks approximately 5 shots of vodka per day. He denies illicit drug use.    ED course, 124/94, , 96.9F, SpO2 98% on 3lpm. No significant lab findings. CXR shows bilateral edema. An xray of the left hip showed an impacted varus angulated left femoral neck fracture. He was admitted to the Family Medicine in-patient service for medical management before and after orthopedic surgery.      Overview/Hospital Course:  Ortho consulted. Patient taken for L hip hemiarthoplasty on 9/24, with no significant complications. PT/OT post-op. Clinic follow up 2 weeks with Ortho Dr. Woodruff       Interval History:   NAEON. VSSAF on 5lpm. No complaints. Working with PT/OT. Resuming home meds.    Review of Systems   Constitutional:  Negative for fatigue and fever.   Respiratory:  Negative for chest tightness and shortness of breath.    Cardiovascular:  Negative for chest pain, palpitations and  leg swelling.   Gastrointestinal:  Negative for abdominal pain, constipation, diarrhea, nausea and vomiting.   Musculoskeletal:  Positive for arthralgias.        L hip   Neurological:  Negative for dizziness, syncope, weakness, light-headedness, numbness and headaches.     Objective:     Vital Signs (Most Recent):  Temp: 98.3 °F (36.8 °C) (09/25/22 2325)  Pulse: 88 (09/26/22 0733)  Resp: 16 (09/26/22 0733)  BP: 128/71 (09/25/22 2325)  SpO2: 96 % (09/26/22 0733) Vital Signs (24h Range):  Temp:  [98.3 °F (36.8 °C)-99.1 °F (37.3 °C)] 98.3 °F (36.8 °C)  Pulse:  [53-89] 88  Resp:  [16-26] 16  SpO2:  [72 %-99 %] 96 %  BP: (117-132)/(57-75) 128/71     Weight: 80.7 kg (177 lb 14.6 oz)  Body mass index is 25.53 kg/m².    Intake/Output Summary (Last 24 hours) at 9/26/2022 0744  Last data filed at 9/26/2022 0700  Gross per 24 hour   Intake 157.48 ml   Output 1950 ml   Net -1792.52 ml      Physical Exam  Constitutional:       Appearance: Normal appearance.   HENT:      Head: Normocephalic.      Mouth/Throat:      Mouth: Mucous membranes are moist.   Eyes:      Extraocular Movements: Extraocular movements intact.      Pupils: Pupils are equal, round, and reactive to light.   Cardiovascular:      Rate and Rhythm: Rhythm irregular.      Pulses: Normal pulses.      Heart sounds: Normal heart sounds.   Pulmonary:      Effort: Pulmonary effort is normal.      Breath sounds: Rhonchi and rales present.   Abdominal:      General: Bowel sounds are normal. There is distension.      Tenderness: There is no abdominal tenderness. There is no guarding.   Musculoskeletal:      Cervical back: Normal range of motion and neck supple. No tenderness.      Comments: S/p L hip hemiarthoplasty, bandage clean, dry, intact   Skin:     General: Skin is warm.      Coloration: Skin is not pale.   Neurological:      General: No focal deficit present.      Mental Status: He is alert and oriented to person, place, and time.      Cranial Nerves: No cranial  nerve deficit.      Sensory: No sensory deficit.       Significant Labs: All pertinent labs within the past 24 hours have been reviewed.  CBC:   Recent Labs   Lab 09/25/22  0601 09/26/22  0453   WBC 8.16 7.27   HGB 12.8* 12.0*   HCT 39.2* 36.3*   PLT 78* 75*     CMP:   Recent Labs   Lab 09/25/22  0601 09/25/22  1556 09/26/22  0453   * 133* 136   K 4.2 3.8 3.6   CL 99 98 98   CO2 26 27 26   * 153* 104   BUN 18 19 17   CREATININE 0.8 0.8 0.7   CALCIUM 9.0 9.0 9.0   PROT 6.1  --  6.1   ALBUMIN 2.4*  --  2.2*   BILITOT 1.2*  --  1.3*   ALKPHOS 63  --  66   AST 21  --  25   ALT 13  --  10   ANIONGAP 9 8 12       Significant Imaging: I have reviewed all pertinent imaging results/findings within the past 24 hours.      Assessment/Plan:      * Closed 2-part intertrochanteric fracture of proximal end of left femur, initial encounter  Mechanical fall 9/22 approx 2200, fell to Left side, denies LOC or head trauma  Evidence of fx on xray  Cardiac clearance given due to ICD placed in 2007    PLAN:  - Ortho consulted. S/p L hemiarthroplasty on 9/24.   -WBAT with posterior hip precautions  -Hip abduction pillow on at all times while in bed  - PT/OT  -Pain control       Pathologic fracture of neck of left femur  See above      Displaced fracture of left femoral neck        AICD (automatic cardioverter/defibrillator) present  Cards to evaluate    PLAN:  - f/u post-op as needed      CHF (congestive heart failure)  Prior diagnosis in Care Everywhere. Endorses recent cough, sleeping sitting up. Currently SpO2 in mid90s on 2lpm. No signs of overload on exam. EF as of 9/23 - 35.  Prescribed Lasix 20 at home, does not take     PLAN:  - Cards recs  - IV Lasix 40 mg x 1 today  - Resumed home dose of Lasix 20 BID         CAD (coronary artery disease)  S/p 3 stents after MI in Feb '22, s/p ICD 2007  States nonadherance to any medication  On ASA, statin, BB, ARB and ALdactone as an outpatient    PLAN:  - Cardiology consult.  Appreciate recs  - BB and statin therapy resumed  -Home ARB and Aldactone still held as BP is normotensive while inpatient  -Lovenox started post-op              VTE Risk Mitigation (From admission, onward)         Ordered     enoxaparin injection 40 mg  Daily         09/24/22 1409     IP VTE HIGH RISK PATIENT  Once         09/23/22 1120                Discharge Planning   FABIENNE:      Code Status: Partial Code   Is the patient medically ready for discharge?:     Reason for patient still in hospital (select all that apply): Patient trending condition  Discharge Plan A: Home Health                  aH Landa MD  Department of Hospital Medicine   [facility]

## 2022-09-26 NOTE — PHYSICIAN QUERY
PT Name: Ángel Curtis  MR #: [id number]     DOCUMENTATION CLARIFICATION     CDS/: Yesenia Shin RN BSN             Contact information:[email or web address]  This form is a permanent document in the medical record.     Query Date: September 26, 2022    By submitting this query, we are merely seeking further clarification of documentation.  Please utilize your independent clinical judgment when addressing the question(s) below.    The Medical Record contains the following   Indicators Supporting Clinical Findings Location in Medical Record   x Heart Failure documented CHF (congestive heart failure)  - related to systolic etiology per patient; reports EF 20% in 2014 with MI with repeat EF this year 40% 9/23/22 Cardiology Consult    BNP     x EF/Echo ECHO  EF 35% 9/23/22 ECHO   x Radiology findings Prominent interstitial opacities could relate to pulmonary vascular congestion/edema.  Infectious or inflammatory etiology difficult to exclude. 9/23/22 CXR    Subjective/Objective Respiratory Conditions      Recent/Current MI      Heart Transplant, LVAD      Edema, JVD      Ascites     x Diuretics/Meds 9/25 IV Lasix 40mg once  9/26 IV Lasix 20mg once 9/25-9/26/33 MAR    Other Treatment     x Other Oxygen requirement slowly increasing on 5L NC, will give Duoneb treatment and IV Lasix 40 and resume home Lasix tonight 9/25/22 Hospital Medicine Progress Note     Heart failure is a clinical diagnosis which includes symptomatic fluid retention, elevated intracardiac pressures, and/or the inability of the heart to deliver adequate blood flow.    Heart Failure with reduced Ejection Fraction (HFrEF) or Systolic Heart Failure (loses ability to contract normally, EF is <40%)    Heart Failure with preserved Ejection Fraction (HFpEF) or Diastolic Heart Failure (stiff ventricles, does not relax properly, EF is >50%)     Heart Failure with Combined Systolic and Diastolic Failure (stiff ventricles, does not relax properly and EF  is <50%)    Mid-range or mildly reduced ejection fraction (HFmrEF) is classified as systolic heart failure.  Congestive heart failure with a recovered EF is classified as Diastolic Heart Failure.  Common clues to acute exacerbation:  Rapidly progressive symptoms (w/in 2 weeks of presentation), using IV diuretics, using supplemental O2, pulmonary edema on Xray, new or worsening pleural effusion, +JVD or other signs of volume overload, MI w/in 4 weeks, and/or BNP >500  The clinical guidelines noted are only system guidelines, and do not replace the providers clinical judgment.    Provider, please clarify Systolic Heart Failure:    [   ]  Acute Systolic Heart Failure (HFrEF or HFmrEF) - new diagnosis   [ X ]  Acute on Chronic Systolic Heart Failure (HFrEF or HFmrEF) - worsening of CHF signs/symptoms in preexisting CHF   [   ]  Chronic Systolic Heart Failure (HFrEF or HFmrEF) - preexisting and stable   [   ]  Other (please specify): ___________________________________   [  ]  Clinically Undetermined           Please document in your progress notes daily for the duration of treatment until resolved and include in your discharge summary.    References:  American Heart Association editorial staff. (2017, May). Ejection Fraction Heart Failure Measurement. American Heart Association. https://www.heart.org/en/health-topics/heart-failure/diagnosing-heart-failure/ejection-fraction-heart-failure-measurement#:~:text=Ejection%20fraction%20(EF)%20is%20a,pushed%20out%20with%20each%20heartbeat  BRITTANY Cabrera (2020, December 15). Heart failure with preserved ejection fraction: Clinical manifestations and diagnosis. [employer]. https://www.[employer]/contents/heart-failure-with-preserved-ejection-fraction-clinical-manifestations-and-diagnosis.  ICD-10-CM/PCS Coding Clinic Third Quarter ICD-10, Effective with discharges: September 8, 2020 [facility] § Heart failure with mid-range or mildly reduced ejection fraction  (2020).  ICD-10-CM/PCS Coding Clinic Third Quarter ICD-10, Effective with discharges: September 8, 2020 [facility] § Heart failure with recovered ejection fraction (2020).  Form No. 06069

## 2022-09-26 NOTE — PLAN OF CARE
Problem: Occupational Therapy  Goal: Occupational Therapy Goal  Description: Goals to be met by: 10/25     Patient will increase functional independence with ADLs by performing:    LE Dressing with Minimal Assistance and Assistive Devices as needed.  Grooming while standing at sink with Stand-by Assistance and Contact Guard Assistance.  Toileting from bedside commode with Minimal Assistance for hygiene and clothing management.   Toilet transfer to bedside commode with Minimal Assistance.  Increased functional strength to WFL for ADLs.    Outcome: Ongoing, Progressing       Pt progressing towards goals. Able to stand x 2 trials with RW with decreased assist and elevated bed height. Pt remains with significant pain and requires increased time for axs; increased assist for ADLs. Cont post acute placement at d/c.

## 2022-09-26 NOTE — PLAN OF CARE
Pt AOx4. Slept/rested peacefully throughout night. No c/o N/V or pain. Safety maintained throughout shift.      Problem: Heart Failure Comorbidity  Goal: Maintenance of Heart Failure Symptom Control  Outcome: Ongoing, Progressing     Problem: Hypertension Comorbidity  Goal: Blood Pressure in Desired Range  Outcome: Ongoing, Progressing

## 2022-09-26 NOTE — PROGRESS NOTES
IP Liaison - Initial Visit Note    Patient: Ángel Curtis  MRN:  [id number]  Date of Service:  9/26/2022  Completed by:  KEEGAN Carrizales    Reason for Visit   Patient presents with    IP Liaison Initial Visit       RSW met with patient and pt spouse Sergei at bedside in order to complete SDOH questionnaire and liaison assessment.  Pt has identified no social barriers to care. Pt CM notified RSW of pt substance use, RSW provided pt and Sergei with substance use resources, Sergei expressed understanding of resources at this time.     The following were addressed during this visit:  - Review SDOH Questions   - Complete patient assessment   - Discuss and review program options to address drinking habits   - Complete initial visit with patient       Patient Summary     IP Liaison Patient Assessment    General  Level of Caregiver support: Member independent and does not need caregiver assistance  Have you had to make a decision between paying for any of the following in the last 2 months?: None  Transportation means: Self, Family  Assessments  Was the PHQ Depression Screening completed this visit?: No  Was the RAZA-7 Screening completed this visit?: No       KEEGAN Carrizales

## 2022-09-26 NOTE — PROGRESS NOTES
[facility] Progress Note    72M left femoral neck fx s/p hemiarthroplasty 9/24/22    S:  NAEON. VSS. Resting comfortably in bed with hip abduction pillow in place. C/o left hip soreness, pain overall well controlled on oral medication. Denies numbness or tingling. No fever or chills. No SOB, CP. No other issues at this time.    O:   Vitals:    09/26/22 0850   BP:    Pulse:    Resp: 18   Temp:      Recent Labs     09/26/22  0453   WBC 7.27   HGB 12.0*   HCT 36.3*   PLT 75*     Recent Labs     09/26/22  0453      K 3.6   CL 98   CO2 26   BUN 17        No results for input(s): ESR, CRP in the last 72 hours.    Post OP films: interval placement of femoral prosthesis and cement mantle without complication     Exam:     Surgical dressing clean dry intact.  No saturation or strike through  No calf tenderness to palpation  No pain with passive stretch of toes  SILT Sa/Fenton/DP/SP/T  EHL/FHL/TA/GSC intact  Foot WWP, palpable DP     A/P: 72M S/P L hip hemiarthroplasty     - WBAT with posterior hip precautions  - Hip abduction pillow on at all times while in bed  - Physical therapy eval and treat   - Ancef 24 hr post op completed  - KIRBY/SCDs  - Multimodal pain control  - Ice pack therapy  - Recommend DVT chemoprophylaxis with lovenox while inpatient; ASA 81mg BID for 30 days upon discharge  - Clinic follow up 2 weeks with Dr. Woodruff   - Continue appropriate postop medical care per orders     Chico Sauceda MD

## 2022-09-26 NOTE — ASSESSMENT & PLAN NOTE
Prior diagnosis in Care Everywhere. Endorses recent cough, sleeping sitting up. Currently SpO2 in mid90s on 2lpm. No signs of overload on exam. EF as of 9/23 - 35.  Prescribed Lasix 20 at home, does not take     PLAN:  - Cards recs  - IV Lasix 40 mg x 1 today  - Resumed home dose of Lasix 20 BID

## 2022-09-26 NOTE — PLAN OF CARE
Patient is awake and alert.  Has complaints of pain and pain medications given as per requests.  Aquacel intact to left hip.  Castorena catheter is intact and draining karan color urine.  Received Magnesium Sulfate riders this shift.  Wife visited.  Worked with PT/OT.  Safety is maintained with bed low, wheels locked and side rails up.  Call light within reach.  Will continue to monitor.

## 2022-09-26 NOTE — PT/OT/SLP PROGRESS
Occupational Therapy   Treatment    Name: Ángel Curtis  MRN: [id number]  Admitting Diagnosis:  Closed 2-part intertrochanteric fracture of proximal end of left femur, initial encounter  2 Days Post-Op    Recommendations:     Discharge Recommendations:  (post acute placement)  Discharge Equipment Recommendations:   (per facility)  Barriers to discharge:  None    Assessment:     Ángel Curtis is a 72 y.o. male with a medical diagnosis of Closed 2-part intertrochanteric fracture of proximal end of left femur, initial encounter.  He presents with The primary encounter diagnosis was Closed 2-part intertrochanteric fracture of proximal end of left femur, initial encounter. Diagnoses of Pre-op evaluation, Fall, initial encounter, Displaced fracture of left femoral neck, CAD (coronary artery disease), and Pathological fracture of neck of left femur, initial encounter [M84.452A (ICD-10-CM)] were also pertinent to this visit.  . Performance deficits affecting function are weakness, impaired endurance, impaired self care skills, impaired functional mobility, gait instability, impaired balance, decreased lower extremity function, decreased safety awareness, pain, edema, impaired skin, decreased coordination.     Pt progressing towards goals. Able to stand x 2 trials with RW with decreased assist and elevated bed height. Pt remains with significant pain and requires increased time for axs; increased assist for ADLs. Cont post acute placement at d/c.     Rehab Prognosis:  Good; patient would benefit from acute skilled OT services to address these deficits and reach maximum level of function.       Plan:     Patient to be seen 5 x/week to address the above listed problems via self-care/home management, therapeutic activities, therapeutic exercises  Plan of Care Expires: 10/25/22  Plan of Care Reviewed with: patient    Subjective     Pain/Comfort:  Pain Rating 1:  (none at rest; 7/10 with movement)  Location - Side 1: Left  Location  - Orientation 1: generalized  Location 1: leg  Pain Addressed 1: Reposition, Distraction, Cessation of Activity, Nurse notified, Pre-medicate for activity    Objective:     Communicated with: nsdaysi prior to session.  Patient found supine with   upon OT entry to room.    General Precautions: Standard, fall   Orthopedic Precautions:LLE posterior precautions, LLE weight bearing as tolerated   Braces: N/A  Respiratory Status: on supplemental O2      Occupational Performance:     Bed Mobility:    Patient completed Scooting/Bridging with maximal assistance and 2 persons  Patient completed Supine to Sit with total assistance and 2 persons  Patient completed Sit to Supine with maximal assistance and 2 persons     Functional Mobility/Transfers:  Patient completed Sit <> Stand Transfer with moderate assistance  with  rolling walker   Functional Mobility: Mod A lateral steps to HOB with maximal cues     Activities of Daily Living:  Grooming: stand by assistance seated EOB   Lower Body Dressing: total assistance    Toileting: total assistance        [facility] 6 Click ADL: 16    Treatment & Education:  Pt continues to require increased assist for bed mobility; rigid and stiff causing assist of 2 despite maximal verbal cues for tech   Significant posterior pushing upon initial coming to sit EOB and total A to bring hips posterior to prevent sliding off of EOB   R lateral leaning while seated in attempts to off weight painful R hip  Reaching performed with RUE to L knee to correct posture to midline   Stood x 2 trials during session; cues for hand placement each trial; increased time for rest breaks and for actual performance   In stance, pt with R lateral leaning, elevated at shoulders and flexed/bent at BUEs ; increased time to correct to midline; maximal verbal cues required for weight shifting and pushing through BUEs   Lateral steps performed to HOB with RW; maximal verbal cues for sequence and steps   Returned to supine   Placed  on bed pan with rolling; maximal assist to roll to L     Patient left supine with all lines intact, call button in reach, bed alarm on, and nsg notified    GOALS:   Multidisciplinary Problems       Occupational Therapy Goals          Problem: Occupational Therapy    Goal Priority Disciplines Outcome Interventions   Occupational Therapy Goal     OT, PT/OT Ongoing, Progressing    Description: Goals to be met by: 10/25     Patient will increase functional independence with ADLs by performing:    LE Dressing with Minimal Assistance and Assistive Devices as needed.  Grooming while standing at sink with Stand-by Assistance and Contact Guard Assistance.  Toileting from bedside commode with Minimal Assistance for hygiene and clothing management.   Toilet transfer to bedside commode with Minimal Assistance.  Increased functional strength to WFL for ADLs.                         Time Tracking:     OT Date of Treatment: 09/26/22  OT Start Time: 0943  OT Stop Time: 1009  OT Total Time (min): 26 min    Billable Minutes:Therapeutic Activity 26    OT/KHURRAM: OT     KHURRAM Visit Number: 0    9/26/2022

## 2022-09-26 NOTE — PLAN OF CARE
[facility] SW will follow.     Everett Muñoz MSROSALIND  [phone]    Future Appointments   Date Time Provider Department Center   10/3/2022  2:40 PM [facility]        09/26/22 1042   Post-Acute Status   Post-Acute Authorization Placement   Discharge Plan   Discharge Plan A Skilled Nursing Facility

## 2022-09-26 NOTE — PT/OT/SLP PROGRESS
Physical Therapy Treatment    Patient Name:  Ángel Curtis   MRN:  [id number]    Recommendations:     Discharge Recommendations:   (post acute placement)   Discharge Equipment Recommendations:  (TBD)   Barriers to Discharge:  requires increased assist    Assessment:     Ángel Curtis is a 72 y.o. male admitted with a medical diagnosis of Closed 2-part intertrochanteric fracture of proximal end of left femur, initial encounter.  He presents with the following impairments/functional limitations:  weakness, impaired endurance, impaired self care skills, impaired functional mobility, gait instability, impaired balance, decreased lower extremity function, pain, decreased ROM, orthopedic precautions. Pt completed 2 stands with mod A with RW and side steps R with RW and mod A, pt deferring forward steps 2/2 LLE pain. Recommending post acute placement upon d/c.    Rehab Prognosis: Good; patient would benefit from acute skilled PT services to address these deficits and reach maximum level of function.    Recent Surgery: Procedure(s) (LRB):  HEMIARTHROPLASTY, HIP (Left) 2 Days Post-Op    Plan:     During this hospitalization, patient to be seen daily to address the identified rehab impairments via gait training, therapeutic activities, therapeutic exercises, neuromuscular re-education and progress toward the following goals:    Plan of Care Expires:  10/20/22    Subjective     Chief Complaint: L thigh pain with any LLE mobility    Pain/Comfort:  Pain Rating 1:  (none at rest, 10/10 in standing)  Location - Side 1: Left  Location 1: thigh  Pain Addressed 1: Reposition, Distraction, Cessation of Activity, Nurse notified, Pre-medicate for activity  Pain Rating Post-Intervention 1:  (not rated)      Objective:     Communicated with nurse [place]brook prior to session. Patient found HOB elevated with bed alarm, hip abduction pillow, telemetry, payton catheter, peripheral IV upon PT entry to room.     General Precautions: Standard, fall    Orthopedic Precautions:LLE weight bearing as tolerated, LLE posterior precautions   Braces: N/A     Functional Mobility:  Bed Mobility:     Scooting: minimum assistance and to scoot forward while sitting EOB  Supine to Sit: total assistance and of 2 persons  Sit to Supine: maximal assistance and of 2 persons  Transfers:     Sit to Stand:  moderate assistance with rolling walker  Gait: 3 side steps right with RW and mod A - flexed posture and difficulty with LLE WB, remaining primarily with RLE WB. Max cueing for upright posture and midline positioning.       AM-PAC 6 CLICK MOBILITY  Turning over in bed (including adjusting bedclothes, sheets and blankets)?: 2  Sitting down on and standing up from a chair with arms (e.g., wheelchair, bedside commode, etc.): 2  Moving from lying on back to sitting on the side of the bed?: 1  Moving to and from a bed to a chair (including a wheelchair)?: 1  Need to walk in hospital room?: 1  Climbing 3-5 steps with a railing?: 1  Basic Mobility Total Score: 8       Therapeutic Activities and Exercises:  Educated pt on role of PT and pt agreeable to participate in therapy session although remains with flat affect.  Instructed in supine LE exercises x 5 reps: APs, heel slides (AAROM LLE), QS/GS, and SAQs (AAROM LLE). Limited L quad activation initially requiring assist to complete exercises, improved activation with continued reps.  Transitioned to sit EOB - pt with significant trunk extension 2/2 pain leading to hips scooting anteriorly towards EOB.  In sitting pt with R lateral leaning to off-weight L hip.  Instructed in midline positioning then on APs, LAQs - initially required AAROM to L knee extension then improved with continued reps.  Completed 2 stands and side stepping and when asked to ambulate forward pt reporting he can't due to pain.  Returned to supine.  Educated pt to participate in supine LE exercises 2-3x/day.  Pt placed on bed pan.    Patient left HOB elevated with  all lines intact, call button in reach, bed alarm on, nurse notified, and pt's wife present.    GOALS:   Multidisciplinary Problems       Physical Therapy Goals          Problem: Physical Therapy    Goal Priority Disciplines Outcome Goal Variances Interventions   Physical Therapy Goal     PT, PT/OT Ongoing, Progressing     Description: Goals to be met by: 10/10/22    Patient will increase functional independence with mobility by performin.  Supine to/from sit with CG of 1  2.  Bed to/from chair with RW with CG of 1 with WBAT LLE  3.  Up in chair 1 hour  4.  Ambulate 75' with RW with CG of 1 with WBAT LLE  5.  Knowledge of hip precautions                         Time Tracking:     PT Received On: 22  PT Start Time: 932     PT Stop Time: 1006  PT Total Time (min): 34 min     Billable Minutes: Therapeutic Activity 20 and Therapeutic Exercise 14       PT/PTA: PT     PTA Visit Number: 0     2022

## 2022-09-27 ENCOUNTER — PATIENT OUTREACH (OUTPATIENT)
Dept: ADMINISTRATIVE | Facility: OTHER | Age: 72
End: 2022-09-27
Payer: MEDICARE

## 2022-09-27 LAB
ALBUMIN SERPL BCP-MCNC: 2.2 G/DL (ref 3.5–5.2)
ALP SERPL-CCNC: 64 U/L (ref 55–135)
ALT SERPL W/O P-5'-P-CCNC: 14 U/L (ref 10–44)
ANION GAP SERPL CALC-SCNC: 10 MMOL/L (ref 8–16)
AST SERPL-CCNC: 38 U/L (ref 10–40)
BASOPHILS # BLD AUTO: 0.02 K/UL (ref 0–0.2)
BASOPHILS NFR BLD: 0.3 % (ref 0–1.9)
BILIRUB SERPL-MCNC: 1.1 MG/DL (ref 0.1–1)
BUN SERPL-MCNC: 19 MG/DL (ref 8–23)
CALCIUM SERPL-MCNC: 9 MG/DL (ref 8.7–10.5)
CHLORIDE SERPL-SCNC: 95 MMOL/L (ref 95–110)
CO2 SERPL-SCNC: 29 MMOL/L (ref 23–29)
CREAT SERPL-MCNC: 0.7 MG/DL (ref 0.5–1.4)
DIFFERENTIAL METHOD: ABNORMAL
EOSINOPHIL # BLD AUTO: 0.1 K/UL (ref 0–0.5)
EOSINOPHIL NFR BLD: 1.6 % (ref 0–8)
ERYTHROCYTE [DISTWIDTH] IN BLOOD BY AUTOMATED COUNT: 14.3 % (ref 11.5–14.5)
EST. GFR  (NO RACE VARIABLE): >60 ML/MIN/1.73 M^2
GLUCOSE SERPL-MCNC: 130 MG/DL (ref 70–110)
HCT VFR BLD AUTO: 36 % (ref 40–54)
HGB BLD-MCNC: 12.1 G/DL (ref 14–18)
IMM GRANULOCYTES # BLD AUTO: 0.02 K/UL (ref 0–0.04)
IMM GRANULOCYTES NFR BLD AUTO: 0.3 % (ref 0–0.5)
LYMPHOCYTES # BLD AUTO: 1.1 K/UL (ref 1–4.8)
LYMPHOCYTES NFR BLD: 17 % (ref 18–48)
MAGNESIUM SERPL-MCNC: 1.9 MG/DL (ref 1.6–2.6)
MCH RBC QN AUTO: 33.5 PG (ref 27–31)
MCHC RBC AUTO-ENTMCNC: 33.6 G/DL (ref 32–36)
MCV RBC AUTO: 100 FL (ref 82–98)
MONOCYTES # BLD AUTO: 1 K/UL (ref 0.3–1)
MONOCYTES NFR BLD: 14.7 % (ref 4–15)
NEUTROPHILS # BLD AUTO: 4.4 K/UL (ref 1.8–7.7)
NEUTROPHILS NFR BLD: 66.1 % (ref 38–73)
NRBC BLD-RTO: 0 /100 WBC
PHOSPHATE SERPL-MCNC: 3.3 MG/DL (ref 2.7–4.5)
PLATELET # BLD AUTO: 106 K/UL (ref 150–450)
PMV BLD AUTO: 11.9 FL (ref 9.2–12.9)
POCT GLUCOSE: 128 MG/DL (ref 70–110)
POCT GLUCOSE: 136 MG/DL (ref 70–110)
POCT GLUCOSE: 139 MG/DL (ref 70–110)
POCT GLUCOSE: 231 MG/DL (ref 70–110)
POTASSIUM SERPL-SCNC: 3.2 MMOL/L (ref 3.5–5.1)
PROT SERPL-MCNC: 6.4 G/DL (ref 6–8.4)
RBC # BLD AUTO: 3.61 M/UL (ref 4.6–6.2)
SODIUM SERPL-SCNC: 134 MMOL/L (ref 136–145)
WBC # BLD AUTO: 6.72 K/UL (ref 3.9–12.7)

## 2022-09-27 PROCEDURE — 25000003 PHARM REV CODE 250

## 2022-09-27 PROCEDURE — 63600175 PHARM REV CODE 636 W HCPCS: Performed by: STUDENT IN AN ORGANIZED HEALTH CARE EDUCATION/TRAINING PROGRAM

## 2022-09-27 PROCEDURE — 97530 THERAPEUTIC ACTIVITIES: CPT

## 2022-09-27 PROCEDURE — 94640 AIRWAY INHALATION TREATMENT: CPT

## 2022-09-27 PROCEDURE — 36415 COLL VENOUS BLD VENIPUNCTURE: CPT

## 2022-09-27 PROCEDURE — 21400001 HC TELEMETRY ROOM

## 2022-09-27 PROCEDURE — 97530 THERAPEUTIC ACTIVITIES: CPT | Mod: CO

## 2022-09-27 PROCEDURE — 94799 UNLISTED PULMONARY SVC/PX: CPT

## 2022-09-27 PROCEDURE — 25000003 PHARM REV CODE 250: Performed by: STUDENT IN AN ORGANIZED HEALTH CARE EDUCATION/TRAINING PROGRAM

## 2022-09-27 PROCEDURE — 80053 COMPREHEN METABOLIC PANEL: CPT

## 2022-09-27 PROCEDURE — 94761 N-INVAS EAR/PLS OXIMETRY MLT: CPT

## 2022-09-27 PROCEDURE — 25000003 PHARM REV CODE 250: Performed by: NURSE PRACTITIONER

## 2022-09-27 PROCEDURE — 84100 ASSAY OF PHOSPHORUS: CPT

## 2022-09-27 PROCEDURE — 63600175 PHARM REV CODE 636 W HCPCS

## 2022-09-27 PROCEDURE — 97116 GAIT TRAINING THERAPY: CPT

## 2022-09-27 PROCEDURE — 99900035 HC TECH TIME PER 15 MIN (STAT)

## 2022-09-27 PROCEDURE — 27000221 HC OXYGEN, UP TO 24 HOURS

## 2022-09-27 PROCEDURE — 85025 COMPLETE CBC W/AUTO DIFF WBC: CPT

## 2022-09-27 PROCEDURE — 97110 THERAPEUTIC EXERCISES: CPT

## 2022-09-27 PROCEDURE — 83735 ASSAY OF MAGNESIUM: CPT

## 2022-09-27 PROCEDURE — 25000242 PHARM REV CODE 250 ALT 637 W/ HCPCS: Performed by: STUDENT IN AN ORGANIZED HEALTH CARE EDUCATION/TRAINING PROGRAM

## 2022-09-27 PROCEDURE — 97535 SELF CARE MNGMENT TRAINING: CPT | Mod: CO

## 2022-09-27 RX ORDER — HYDROMORPHONE HYDROCHLORIDE 1 MG/ML
0.5 INJECTION, SOLUTION INTRAMUSCULAR; INTRAVENOUS; SUBCUTANEOUS ONCE
Status: COMPLETED | OUTPATIENT
Start: 2022-09-27 | End: 2022-09-27

## 2022-09-27 RX ORDER — KETOROLAC TROMETHAMINE 30 MG/ML
15 INJECTION, SOLUTION INTRAMUSCULAR; INTRAVENOUS ONCE
Status: DISCONTINUED | OUTPATIENT
Start: 2022-09-27 | End: 2022-09-27

## 2022-09-27 RX ADMIN — FUROSEMIDE 20 MG: 20 TABLET ORAL at 05:09

## 2022-09-27 RX ADMIN — HYDROMORPHONE HYDROCHLORIDE 0.5 MG: 1 INJECTION, SOLUTION INTRAMUSCULAR; INTRAVENOUS; SUBCUTANEOUS at 12:09

## 2022-09-27 RX ADMIN — METOPROLOL TARTRATE 50 MG: 50 TABLET, FILM COATED ORAL at 08:09

## 2022-09-27 RX ADMIN — PRAVASTATIN SODIUM 40 MG: 40 TABLET ORAL at 08:09

## 2022-09-27 RX ADMIN — ENOXAPARIN SODIUM 40 MG: 100 INJECTION SUBCUTANEOUS at 05:09

## 2022-09-27 RX ADMIN — DOCUSATE SODIUM AND SENNOSIDES 1 TABLET: 8.6; 5 TABLET, FILM COATED ORAL at 08:09

## 2022-09-27 RX ADMIN — FLUTICASONE FUROATE AND VILANTEROL TRIFENATATE 1 PUFF: 100; 25 POWDER RESPIRATORY (INHALATION) at 08:09

## 2022-09-27 RX ADMIN — OXYCODONE HYDROCHLORIDE AND ACETAMINOPHEN 1 TABLET: 5; 325 TABLET ORAL at 09:09

## 2022-09-27 RX ADMIN — IPRATROPIUM BROMIDE AND ALBUTEROL SULFATE 3 ML: 2.5; .5 SOLUTION RESPIRATORY (INHALATION) at 08:09

## 2022-09-27 RX ADMIN — ASPIRIN 81 MG: 81 TABLET, CHEWABLE ORAL at 08:09

## 2022-09-27 RX ADMIN — OXYCODONE HYDROCHLORIDE AND ACETAMINOPHEN 1 TABLET: 5; 325 TABLET ORAL at 03:09

## 2022-09-27 RX ADMIN — MUPIROCIN: 20 OINTMENT TOPICAL at 08:09

## 2022-09-27 RX ADMIN — FUROSEMIDE 20 MG: 20 TABLET ORAL at 08:09

## 2022-09-27 RX ADMIN — OXYCODONE HYDROCHLORIDE AND ACETAMINOPHEN 1 TABLET: 5; 325 TABLET ORAL at 06:09

## 2022-09-27 RX ADMIN — IPRATROPIUM BROMIDE AND ALBUTEROL SULFATE 3 ML: 2.5; .5 SOLUTION RESPIRATORY (INHALATION) at 01:09

## 2022-09-27 NOTE — SUBJECTIVE & OBJECTIVE
Interval History:   NAEON. VSSAF on 5lpm. Lower sats at night. States breathing treatments are helping. Denies SOB. L hip still painful, but improving. Condom cath to avoid having to shift to commode.    Review of Systems   Constitutional:  Negative for fatigue and fever.   Respiratory:  Negative for chest tightness and shortness of breath.    Cardiovascular:  Negative for chest pain, palpitations and leg swelling.   Gastrointestinal:  Negative for abdominal pain, constipation, diarrhea, nausea and vomiting.   Musculoskeletal:  Positive for arthralgias.        L hip   Neurological:  Negative for dizziness, syncope, weakness, light-headedness, numbness and headaches.     Objective:     Vital Signs (Most Recent):  Temp: 98 °F (36.7 °C) (09/27/22 0506)  Pulse: 63 (09/27/22 0506)  Resp: 18 (09/27/22 0641)  BP: (!) 149/70 (09/27/22 0506)  SpO2: 95 % (09/27/22 0506)   Vital Signs (24h Range):  Temp:  [97.2 °F (36.2 °C)-99.2 °F (37.3 °C)] 98 °F (36.7 °C)  Pulse:  [44-95] 63  Resp:  [16-20] 18  SpO2:  [91 %-98 %] 95 %  BP: (103-149)/(58-74) 149/70     Weight: 81.8 kg (180 lb 5.4 oz)  Body mass index is 25.88 kg/m².    Intake/Output Summary (Last 24 hours) at 9/27/2022 0731  Last data filed at 9/27/2022 0542  Gross per 24 hour   Intake 197.06 ml   Output 1200 ml   Net -1002.94 ml      Physical Exam  Constitutional:       Appearance: Normal appearance.   HENT:      Head: Normocephalic.      Mouth/Throat:      Mouth: Mucous membranes are moist.   Eyes:      Extraocular Movements: Extraocular movements intact.      Pupils: Pupils are equal, round, and reactive to light.   Cardiovascular:      Rate and Rhythm: Rhythm irregular.      Pulses: Normal pulses.      Heart sounds: Normal heart sounds.   Pulmonary:      Effort: Pulmonary effort is normal.      Breath sounds: Normal breath sounds. No rhonchi or rales.      Comments: Lung sounds improved since admission  Abdominal:      General: Bowel sounds are normal. There is  distension.      Tenderness: There is no abdominal tenderness. There is no guarding.   Musculoskeletal:      Cervical back: Normal range of motion and neck supple. No tenderness.      Comments: S/p L hip hemiarthoplasty, bandage clean, dry, intact   Skin:     General: Skin is warm.      Coloration: Skin is not pale.   Neurological:      General: No focal deficit present.      Mental Status: He is alert and oriented to person, place, and time.      Cranial Nerves: No cranial nerve deficit.      Sensory: No sensory deficit.       Significant Labs: All pertinent labs within the past 24 hours have been reviewed.  CBC:   Recent Labs   Lab 09/26/22 0453 09/27/22 0454   WBC 7.27 6.72   HGB 12.0* 12.1*   HCT 36.3* 36.0*   PLT 75* 106*     CMP:   Recent Labs   Lab 09/25/22  1556 09/26/22 0453 09/27/22 0454   * 136 134*   K 3.8 3.6 3.2*   CL 98 98 95   CO2 27 26 29   * 104 130*   BUN 19 17 19   CREATININE 0.8 0.7 0.7   CALCIUM 9.0 9.0 9.0   PROT  --  6.1 6.4   ALBUMIN  --  2.2* 2.2*   BILITOT  --  1.3* 1.1*   ALKPHOS  --  66 64   AST  --  25 38   ALT  --  10 14   ANIONGAP 8 12 10       Significant Imaging: I have reviewed all pertinent imaging results/findings within the past 24 hours.

## 2022-09-27 NOTE — PROGRESS NOTES
[facility] Progress Note    72M left femoral neck fx s/p hemiarthroplasty 9/24/22    S:  NAEON. VSS. Resting comfortably in bed with hip abduction pillow in place. C/o left hip soreness, pain overall well controlled on oral medication. Denies numbness or tingling. No fever or chills. No SOB, CP. No other issues at this time.    O:   Vitals:    09/27/22 0641   BP:    Pulse:    Resp: 18   Temp:      Recent Labs     09/27/22  0454   WBC 6.72   HGB 12.1*   HCT 36.0*   *     Recent Labs     09/27/22  0454   *   K 3.2*   CL 95   CO2 29   BUN 19   *     No results for input(s): ESR, CRP in the last 72 hours.    Post OP films: interval placement of femoral prosthesis and cement mantle without complication     Exam:     Surgical dressing clean dry intact.  No saturation or strike through  No calf tenderness to palpation  No pain with passive stretch of toes  SILT Sa/Fenton/DP/SP/T  EHL/FHL/TA/GSC intact  Foot WWP, palpable DP     A/P: 72M S/P L hip hemiarthroplasty     - WBAT with posterior hip precautions  - Hip abduction pillow on at all times while in bed  - Physical therapy eval and treat   - Ancef 24 hr post op completed  - KIRBY/SCDs  - Multimodal pain control  - Ice pack therapy  - Recommend DVT chemoprophylaxis with lovenox while inpatient; ASA 81mg BID for 30 days upon discharge  - Clinic follow up 2 weeks with Dr. Woodruff   - Continue appropriate postop medical care per orders    Will follow peripherally at this time. Please call with any questions or concerns regarding the care of this patient.      hCico Sauceda MD

## 2022-09-27 NOTE — PLAN OF CARE
Problem: Physical Therapy  Goal: Physical Therapy Goal  Description: Goals to be met by: 10/10/22    Patient will increase functional independence with mobility by performin.  Supine to/from sit with CG of 1  2.  Bed to/from chair with RW with CG of 1 with WBAT LLE  3.  Up in chair 1 hour  4.  Ambulate 75' with RW with CG of 1 with WBAT LLE  5.  Knowledge of hip precautions    Outcome: Ongoing, Progressing     Pt making progress with functional mobility and activity tolerance as he ambulated 7 ft and 4 ft with RW and min A with additional assist for safety and line management. Recommending post acute placement upon d/c.

## 2022-09-27 NOTE — PROGRESS NOTES
KEEGAN met with patient and pt spouse Sergei to discuss discharge and additional patient barriers to care. Pt and Sergei identified no additional social barriers to care. Per pt and Sergei, pt is not in need of additional resources at this time.    The following were addressed during this visit:  -Complete follow-up with patient    KEEGAN Carrizales

## 2022-09-27 NOTE — ASSESSMENT & PLAN NOTE
S/p 3 stents after MI in Feb '22, s/p ICD 2007  States nonadherance to any medication  On ASA, statin, BB, ARB and ALdactone as an outpatient    PLAN:  - BB and statin therapy resumed  -Home ARB and Aldactone still held as BP is normotensive while inpatient  -Lovenox started post-op

## 2022-09-27 NOTE — PROGRESS NOTES
[facility]  Progress Note    Patient Name: Ángel Curtis  MRN: [id number]  Patient Class: IP- Inpatient   Admission Date: 9/23/2022  Length of Stay: 4 days  Attending Physician: Ángel Leo MD  Primary Care Provider: Primary Doctor No        Subjective:     Principal Problem:Closed 2-part intertrochanteric fracture of proximal end of left femur, initial encounter        HPI:  71 y/o M w/pmhx of MI (Feb '22, 3 stents, 2 other MI in past), ICD (2007), CHF, HTN, COPD.   The patient lives at home with his wife. He suffered a mechanical fall on his way to the bathroom at 2200 on the night before presentation to Emergency. He fell to his left, denies LOC, palpitations, chest pain, SOB, fevers, head trauma. He complains of pain to his left hip. He denies other trauma. He notes a recent mild cough, which he attributes to smoking. The patient has been prescribed several medications for his background conditions, but states he has taken no medications at all for approximately one month. He smokes pack/day regularly and drinks approximately 5 shots of vodka per day. He denies illicit drug use.    ED course, 124/94, , 96.9F, SpO2 98% on 3lpm. No significant lab findings. CXR shows bilateral edema. An xray of the left hip showed an impacted varus angulated left femoral neck fracture. He was admitted to the Family Medicine in-patient service for medical management before and after orthopedic surgery.      Overview/Hospital Course:  The patient arrived to our department still complaining of hip pain, but much improved with morphine given in Emergency. He required 2lpm, but had no complaints of chest pain, SOB, d/n/v. Ortho was consulted and evaluated the patient in Emergency. Surgery was scheduled after clearance from Cardiology. Cardiology did not object to surgery, but recommended re-starting home meds after surgery. The patient was taken for L hip hemiarthoplasty on 9/24, with no significant  complications. PT/OT worked with him post-op. His home medicines were restarted, but the patient still required 5lpm by POD 2. He received duonebs treatments, plus a LAMA.     Clinic follow up 2 weeks with Ortho Dr. Woodruff       Interval History:   MARÍA. VSSAF on 5lpm. Lower sats at night. States breathing treatments are helping. Denies SOB. L hip still painful, but improving. Condom cath to avoid having to shift to commode.    Review of Systems   Constitutional:  Negative for fatigue and fever.   Respiratory:  Negative for chest tightness and shortness of breath.    Cardiovascular:  Negative for chest pain, palpitations and leg swelling.   Gastrointestinal:  Negative for abdominal pain, constipation, diarrhea, nausea and vomiting.   Musculoskeletal:  Positive for arthralgias.        L hip   Neurological:  Negative for dizziness, syncope, weakness, light-headedness, numbness and headaches.     Objective:     Vital Signs (Most Recent):  Temp: 98 °F (36.7 °C) (09/27/22 0506)  Pulse: 63 (09/27/22 0506)  Resp: 18 (09/27/22 0641)  BP: (!) 149/70 (09/27/22 0506)  SpO2: 95 % (09/27/22 0506)   Vital Signs (24h Range):  Temp:  [97.2 °F (36.2 °C)-99.2 °F (37.3 °C)] 98 °F (36.7 °C)  Pulse:  [44-95] 63  Resp:  [16-20] 18  SpO2:  [91 %-98 %] 95 %  BP: (103-149)/(58-74) 149/70     Weight: 81.8 kg (180 lb 5.4 oz)  Body mass index is 25.88 kg/m².    Intake/Output Summary (Last 24 hours) at 9/27/2022 0731  Last data filed at 9/27/2022 0542  Gross per 24 hour   Intake 197.06 ml   Output 1200 ml   Net -1002.94 ml      Physical Exam  Constitutional:       Appearance: Normal appearance.   HENT:      Head: Normocephalic.      Mouth/Throat:      Mouth: Mucous membranes are moist.   Eyes:      Extraocular Movements: Extraocular movements intact.      Pupils: Pupils are equal, round, and reactive to light.   Cardiovascular:      Rate and Rhythm: Rhythm irregular.      Pulses: Normal pulses.      Heart sounds: Normal heart sounds.    Pulmonary:      Effort: Pulmonary effort is normal.      Breath sounds: Normal breath sounds. No rhonchi or rales.      Comments: Lung sounds improved since admission  Abdominal:      General: Bowel sounds are normal. There is distension.      Tenderness: There is no abdominal tenderness. There is no guarding.   Musculoskeletal:      Cervical back: Normal range of motion and neck supple. No tenderness.      Comments: S/p L hip hemiarthoplasty, bandage clean, dry, intact   Skin:     General: Skin is warm.      Coloration: Skin is not pale.   Neurological:      General: No focal deficit present.      Mental Status: He is alert and oriented to person, place, and time.      Cranial Nerves: No cranial nerve deficit.      Sensory: No sensory deficit.       Significant Labs: All pertinent labs within the past 24 hours have been reviewed.  CBC:   Recent Labs   Lab 09/26/22 0453 09/27/22 0454   WBC 7.27 6.72   HGB 12.0* 12.1*   HCT 36.3* 36.0*   PLT 75* 106*     CMP:   Recent Labs   Lab 09/25/22  1556 09/26/22 0453 09/27/22  0454   * 136 134*   K 3.8 3.6 3.2*   CL 98 98 95   CO2 27 26 29   * 104 130*   BUN 19 17 19   CREATININE 0.8 0.7 0.7   CALCIUM 9.0 9.0 9.0   PROT  --  6.1 6.4   ALBUMIN  --  2.2* 2.2*   BILITOT  --  1.3* 1.1*   ALKPHOS  --  66 64   AST  --  25 38   ALT  --  10 14   ANIONGAP 8 12 10       Significant Imaging: I have reviewed all pertinent imaging results/findings within the past 24 hours.      Assessment/Plan:      * Closed 2-part intertrochanteric fracture of proximal end of left femur, initial encounter  Mechanical fall 9/22 approx 2200, fell to Left side, denies LOC or head trauma  Evidence of fx on xray  Cardiac clearance given due to ICD placed in 2007    PLAN:  - Ortho consulted. S/p L hemiarthroplasty on 9/24.   -WBAT with posterior hip precautions  -Hip abduction pillow on at all times while in bed  - PT/OT  -Pain control       Pathologic fracture of neck of left femur  See  above      Displaced fracture of left femoral neck  - ortho following  - f/u PT/OT recs      AICD (automatic cardioverter/defibrillator) present  Cards to evaluate    PLAN:  - f/u post-op as needed      Acute on chronic systolic congestive heart failure  Prior diagnosis in Care Everywhere. Endorses recent cough, sleeping sitting up. Currently SpO2 in mid90s on 2lpm. No signs of overload on exam. EF as of 9/23 - 35.  Prescribed Lasix 20 at home, does not take     PLAN:  - Resumed home dose of Lasix 20 BID         CAD (coronary artery disease)  S/p 3 stents after MI in Feb '22, s/p ICD 2007  States nonadherance to any medication  On ASA, statin, BB, ARB and ALdactone as an outpatient    PLAN:  - BB and statin therapy resumed  -Home ARB and Aldactone still held as BP is normotensive while inpatient  -Lovenox started post-op              VTE Risk Mitigation (From admission, onward)         Ordered     enoxaparin injection 40 mg  Daily         09/24/22 1409     IP VTE HIGH RISK PATIENT  Once         09/23/22 1120                Discharge Planning   FABIENNE:      Code Status: Partial Code   Is the patient medically ready for discharge?:     Reason for patient still in hospital (select all that apply): Patient trending condition  Discharge Plan A: Skilled Nursing Facility                  Ha Landa MD  Department of Hospital Medicine   [facility]

## 2022-09-27 NOTE — PLAN OF CARE
Sw met with pt and pt's wife at bedside to discuss d/c planning. They stated that they would prefer [facility] . Sw will get in contact with them. SW will follow.     SIMON Falcon  [phone]    Future Appointments   Date Time Provider Department Center   10/3/2022  2:40 PM Ha Brown MD [facility]        09/27/22 0917   Post-Acute Status   Post-Acute Authorization Placement   Discharge Plan   Discharge Plan A Skilled Nursing Facility

## 2022-09-27 NOTE — ASSESSMENT & PLAN NOTE
Prior diagnosis in Care Everywhere. Endorses recent cough, sleeping sitting up. Currently SpO2 in mid90s on 2lpm. No signs of overload on exam. EF as of 9/23 - 35.  Prescribed Lasix 20 at home, does not take     PLAN:  - Resumed home dose of Lasix 20 BID

## 2022-09-27 NOTE — PLAN OF CARE
SW met with pt's wife to inform her that [facility] Phone: [phone]  should be reaching out to her. SW attempted to call [facility] at [phone], sw left  requesting a return call. SW confirmed with N that they do have SNF auth. MICHEL will follow.    Everett Muñoz MSROSALIND  [phone]    Future Appointments   Date Time Provider Department Center   10/3/2022  2:40 PM Ha Brown MD [facility]        09/27/22 1157   Post-Acute Status   Post-Acute Authorization Placement   Coverage PHN   Discharge Plan   Discharge Plan A Skilled Nursing Facility

## 2022-09-27 NOTE — PLAN OF CARE
MICHEL called in [employer] and faxed PASRR to the state. SW will follow.     MICHEL sent PHN referral.     Everett Muñoz, MSW  [phone]    Future Appointments   Date Time Provider Department Center   10/3/2022  2:40 PM [facility]        09/27/22 0816   Post-Acute Status   Post-Acute Authorization Placement   Discharge Plan   Discharge Plan A Skilled Nursing Facility

## 2022-09-27 NOTE — PLAN OF CARE
Pt resting quietly in bed with eyes closed. Medications administered per MAR. On 4L NC. Cardiac monitoring in progress. Blood glucose monitored. L thigh dressing CDI. Voiding spontaneously. Safety maintained with bed alarm set, side rails raised, and call light in reach.

## 2022-09-27 NOTE — ASSESSMENT & PLAN NOTE
Mechanical fall 9/22 approx 2200, fell to Left side, denies LOC or head trauma  Evidence of fx on xray  Cardiac clearance given due to ICD placed in 2007    PLAN:  - Ortho consulted. S/p L hemiarthroplasty on 9/24.   -WBAT with posterior hip precautions  -Hip abduction pillow on at all times while in bed  - PT/OT  - KIRBY/SCDs  - Multimodal pain control  - Ice pack therapy  - Recommend DVT chemoprophylaxis with lovenox while inpatient; ASA 81mg BID for 30 days upon discharge  - Clinic follow up 2 weeks with Dr. Woodruff      Opt out

## 2022-09-27 NOTE — PT/OT/SLP PROGRESS
Physical Therapy Treatment    Patient Name:  Ángel Curtis   MRN:  [id number]    Recommendations:     Discharge Recommendations:   (post acute placement)   Discharge Equipment Recommendations:  (TBD)   Barriers to Discharge:  requires increased assist for mobility and ADLs    Assessment:     Ángel Curtis is a 72 y.o. male admitted with a medical diagnosis of Closed 2-part intertrochanteric fracture of proximal end of left femur, initial encounter.  He presents with the following impairments/functional limitations:  weakness, impaired endurance, impaired self care skills, impaired functional mobility, gait instability, impaired balance, decreased lower extremity function, decreased ROM, pain, orthopedic precautions, impaired skin. Pt making progress with functional mobility and activity tolerance as he ambulated 7 ft and 4 ft with RW and min A with additional assist for safety and line management. Recommending post acute placement upon d/c.    Rehab Prognosis: Good; patient would benefit from acute skilled PT services to address these deficits and reach maximum level of function.    Recent Surgery: Procedure(s) (LRB):  HEMIARTHROPLASTY, HIP (Left) 3 Days Post-Op    Plan:     During this hospitalization, patient to be seen daily to address the identified rehab impairments via gait training, therapeutic activities, therapeutic exercises, neuromuscular re-education and progress toward the following goals:    Plan of Care Expires:  10/20/22    Subjective     Chief Complaint: LLE pain  Patient/Family Comments/goals: pt endorsing LLE pain and stating increase in pain following bed mobility completed by nursing staff this morning  Pain/Comfort:  Pain Rating 1:  (not rated but reporting L hip pain increase with WB and ROM)  Location - Side 1: Left  Location 1: thigh  Pain Addressed 1: Pre-medicate for activity, Reposition, Distraction, Cessation of Activity, Nurse notified  Pain Rating Post-Intervention 1:  (not  rated)      Objective:     Communicated with nurse prior to session.  Patient found HOB elevated with bed alarm, telemetry, peripheral IV, continuous pulse ox, oxygen upon PT entry to room.     General Precautions: Standard, fall   Orthopedic Precautions:LLE weight bearing as tolerated, LLE posterior precautions   Braces: N/A  Respiratory Status: Nasal cannula, flow 2 L/min     Functional Mobility:  Bed Mobility:     Rolling Left:  maximal assistance  Rolling Right: moderate assistance  Scooting: contact guard assistance  Supine to Sit: maximal assistance and of 2 persons  Sit to Supine: minimum assistance and to raise LLE into bed  Transfers:     Sit to Stand:  moderate assistance for 1st stand, min A for 2nd with rolling walker with 2nd assist present  Gait: 7 ft and 4 ft with RW and min A x 1 and additional assist present to assist with line management. Provided step by step cues for 3-point gait pattern and increased UE WB as needed to off-weight LLE. Further gait limited by LLE pain.      AM-PAC 6 CLICK MOBILITY  Turning over in bed (including adjusting bedclothes, sheets and blankets)?: 2  Sitting down on and standing up from a chair with arms (e.g., wheelchair, bedside commode, etc.): 2  Moving from lying on back to sitting on the side of the bed?: 1  Moving to and from a bed to a chair (including a wheelchair)?: 2  Need to walk in hospital room?: 2  Climbing 3-5 steps with a railing?: 1  Basic Mobility Total Score: 10       Therapeutic Activities and Exercises:  Educated pt on role of PT and pt agreeable to participate in therapy session.  Educated again on importance of completion of LE exercises throughout the days and completed APs and assisted with heel slides.  Pt reporting pain due to bed mobility completed with nursing staff earlier this morning therefore educated pt on safety with rolling, positioning pillow between LEs, and assisting as much as able with mobility.  Transitioned to sit EOB, requiring  significant assist to transition 2/2 pain. Max cueing for pursed lip breathing with all activities.  Educated on posterior hip precautions.  Instructed in LAQs with AAROM provided to LLE for increased ROM.  Sat EOB with SBA with UE bracing on bed, likely for pain management.  Completed 2 stands and brief bouts of ambulation with seated rest break between bouts.  Returned to bed.    Patient left HOB elevated with all lines intact, call button in reach, bed alarm on, nurse notified, and pt's wife present..    GOALS:   Multidisciplinary Problems       Physical Therapy Goals          Problem: Physical Therapy    Goal Priority Disciplines Outcome Goal Variances Interventions   Physical Therapy Goal     PT, PT/OT Ongoing, Progressing     Description: Goals to be met by: 10/10/22    Patient will increase functional independence with mobility by performin.  Supine to/from sit with CG of 1  2.  Bed to/from chair with RW with CG of 1 with WBAT LLE  3.  Up in chair 1 hour  4.  Ambulate 75' with RW with CG of 1 with WBAT LLE  5.  Knowledge of hip precautions                         Time Tracking:     PT Received On: 22  PT Start Time: 1028     PT Stop Time: 1107  PT Total Time (min): 39 min     Billable Minutes: Gait Training 10, Therapeutic Activity 14, and Therapeutic Exercise 15       PT/PTA: PT     PTA Visit Number: 0     2022

## 2022-09-27 NOTE — PLAN OF CARE
Problem: Occupational Therapy  Goal: Occupational Therapy Goal  Description: Goals to be met by: 10/25     Patient will increase functional independence with ADLs by performing:    LE Dressing with Minimal Assistance and Assistive Devices as needed.  Grooming while standing at sink with Stand-by Assistance and Contact Guard Assistance.  Toileting from bedside commode with Minimal Assistance for hygiene and clothing management.   Toilet transfer to bedside commode with Minimal Assistance.  Increased functional strength to WFL for ADLs.    Outcome: Ongoing, Progressing   Ángel Curtis is a 72 y.o. male with a medical diagnosis of Closed 2-part intertrochanteric fracture of proximal end of left femur, initial encounter.  Performance deficits affecting function are weakness, impaired endurance, impaired self care skills, impaired functional mobility, gait instability, impaired balance, decreased lower extremity function, decreased safety awareness, decreased upper extremity function, pain, decreased ROM, impaired skin, edema. Pt found in bed, agreeable to therapy.  Pt with improved performance with therapy. He was able to ambulate with Min A x 2 using RW.  Pt is progressing towards goals. Continue OT services to address functional goals, progressing as able.

## 2022-09-27 NOTE — PT/OT/SLP PROGRESS
Occupational Therapy   Treatment    Name: Ángel Curtis  MRN: [id number]  Admitting Diagnosis:  Closed 2-part intertrochanteric fracture of proximal end of left femur, initial encounter  3 Days Post-Op    Recommendations:     Discharge Recommendations: other (see comments) (post acute placement)  Discharge Equipment Recommendations:  other (see comments) (TBD)  Barriers to discharge:  Other (Comment) (Increased level of assistance)    Assessment:     Ángel Curtis is a 72 y.o. male with a medical diagnosis of Closed 2-part intertrochanteric fracture of proximal end of left femur, initial encounter.  Performance deficits affecting function are weakness, impaired endurance, impaired self care skills, impaired functional mobility, gait instability, impaired balance, decreased lower extremity function, decreased safety awareness, decreased upper extremity function, pain, decreased ROM, impaired skin, edema. Pt found in bed, agreeable to therapy.  Pt with improved performance with therapy. He was able to ambulate with Min A x 2 using RW.  Pt is progressing towards goals. Continue OT services to address functional goals, progressing as able.      Rehab Prognosis:  Good; patient would benefit from acute skilled OT services to address these deficits and reach maximum level of function.       Plan:     Patient to be seen 5 x/week to address the above listed problems via self-care/home management, therapeutic activities, therapeutic exercises  Plan of Care Expires: 10/25/22  Plan of Care Reviewed with: patient    Subjective     Pain/Comfort:  Pain Rating 1:  (L hip pain-did not rate-increases with mobility and wt bearing)    Objective:     Communicated with: RN prior to session.  Patient found HOB elevated with bed alarm, telemetry, peripheral IV upon OT entry to room.    General Precautions: Standard, fall   Orthopedic Precautions:LLE weight bearing as tolerated, LLE posterior precautions   Braces: N/A  Respiratory Status:  Nasal cannula, flow 2 L/min, Increased to 4L during mobility to maintain sats greater then 88%.     Occupational Performance:     Bed Mobility:    Patient completed Rolling/Turning to Left with  maximal assistance and with side rail  Patient completed Rolling/Turning to Right with maximal assistance and with side rail  Patient completed Supine to Sit with maximal assistance, 2 persons, and HOB elevated, increased time and effort, vc's for effective technique  Patient completed Sit to Supine with minimum assistance and BLE assist     Functional Mobility/Transfers:  Patient completed Sit <> Stand Transfer with minimum assistance and of 2 persons  with  rolling walker  x 3 trials and vcs for hand placement/effect tech  Functional Mobility: Pt ambulated with Min A x 2 using RW with vcs for upright posture, sequencing steps, and RW safety/mgmt    Activities of Daily Living:  Grooming: stand by assistance seated EOB      [facility] 6 Click ADL: 16    Treatment & Education:  Pt sat EOB with SBA while performing G/H task.  Reviewed hip precautions and maintaining during fx mobility and BADL's.  Pt reports having a reacher and sock aid at home.  Will instruct pt on use of AE next session.     Patient left HOB elevated with all lines intact, call button in reach, bed alarm on, RN notified, and spouse present    GOALS:   Multidisciplinary Problems       Occupational Therapy Goals          Problem: Occupational Therapy    Goal Priority Disciplines Outcome Interventions   Occupational Therapy Goal     OT, PT/OT Ongoing, Progressing    Description: Goals to be met by: 10/25     Patient will increase functional independence with ADLs by performing:    LE Dressing with Minimal Assistance and Assistive Devices as needed.  Grooming while standing at sink with Stand-by Assistance and Contact Guard Assistance.  Toileting from bedside commode with Minimal Assistance for hygiene and clothing management.   Toilet transfer to bedside commode  with Minimal Assistance.  Increased functional strength to WFL for ADLs.                         Time Tracking:     OT Date of Treatment: 09/27/22  OT Start Time: 1028  OT Stop Time: 1107  OT Total Time (min): 39 min    Billable Minutes:Self Care/Home Management 15  Therapeutic Activity 8  Cotx with PT due to complex nature of patient and for safety with mobility to decrease fall risk for patient and caregiver injury requiring two skilled therapists to provide different interventions.              9/27/2022

## 2022-09-28 VITALS
TEMPERATURE: 98 F | HEIGHT: 70 IN | OXYGEN SATURATION: 95 % | BODY MASS INDEX: 25.81 KG/M2 | DIASTOLIC BLOOD PRESSURE: 59 MMHG | WEIGHT: 180.31 LBS | SYSTOLIC BLOOD PRESSURE: 115 MMHG | RESPIRATION RATE: 17 BRPM | HEART RATE: 60 BPM

## 2022-09-28 LAB
ALBUMIN SERPL BCP-MCNC: 2.5 G/DL (ref 3.5–5.2)
ALP SERPL-CCNC: 76 U/L (ref 55–135)
ALT SERPL W/O P-5'-P-CCNC: 27 U/L (ref 10–44)
ANION GAP SERPL CALC-SCNC: 14 MMOL/L (ref 8–16)
AST SERPL-CCNC: 48 U/L (ref 10–40)
BASOPHILS # BLD AUTO: 0.03 K/UL (ref 0–0.2)
BASOPHILS NFR BLD: 0.4 % (ref 0–1.9)
BILIRUB SERPL-MCNC: 1.3 MG/DL (ref 0.1–1)
BUN SERPL-MCNC: 20 MG/DL (ref 8–23)
CALCIUM SERPL-MCNC: 9.5 MG/DL (ref 8.7–10.5)
CHLORIDE SERPL-SCNC: 93 MMOL/L (ref 95–110)
CO2 SERPL-SCNC: 26 MMOL/L (ref 23–29)
CREAT SERPL-MCNC: 0.8 MG/DL (ref 0.5–1.4)
DIFFERENTIAL METHOD: ABNORMAL
EOSINOPHIL # BLD AUTO: 0.2 K/UL (ref 0–0.5)
EOSINOPHIL NFR BLD: 2.4 % (ref 0–8)
ERYTHROCYTE [DISTWIDTH] IN BLOOD BY AUTOMATED COUNT: 14.4 % (ref 11.5–14.5)
EST. GFR  (NO RACE VARIABLE): >60 ML/MIN/1.73 M^2
GLUCOSE SERPL-MCNC: 155 MG/DL (ref 70–110)
HCT VFR BLD AUTO: 39.4 % (ref 40–54)
HGB BLD-MCNC: 13.1 G/DL (ref 14–18)
IMM GRANULOCYTES # BLD AUTO: 0.04 K/UL (ref 0–0.04)
IMM GRANULOCYTES NFR BLD AUTO: 0.5 % (ref 0–0.5)
LYMPHOCYTES # BLD AUTO: 1.2 K/UL (ref 1–4.8)
LYMPHOCYTES NFR BLD: 16.2 % (ref 18–48)
MAGNESIUM SERPL-MCNC: 2.1 MG/DL (ref 1.6–2.6)
MCH RBC QN AUTO: 33.8 PG (ref 27–31)
MCHC RBC AUTO-ENTMCNC: 33.2 G/DL (ref 32–36)
MCV RBC AUTO: 102 FL (ref 82–98)
MONOCYTES # BLD AUTO: 1.1 K/UL (ref 0.3–1)
MONOCYTES NFR BLD: 14.7 % (ref 4–15)
NEUTROPHILS # BLD AUTO: 5 K/UL (ref 1.8–7.7)
NEUTROPHILS NFR BLD: 65.8 % (ref 38–73)
NRBC BLD-RTO: 0 /100 WBC
PHOSPHATE SERPL-MCNC: 2.7 MG/DL (ref 2.7–4.5)
PLATELET # BLD AUTO: 139 K/UL (ref 150–450)
PMV BLD AUTO: 12 FL (ref 9.2–12.9)
POCT GLUCOSE: 158 MG/DL (ref 70–110)
POTASSIUM SERPL-SCNC: 3.1 MMOL/L (ref 3.5–5.1)
PROT SERPL-MCNC: 7.3 G/DL (ref 6–8.4)
RBC # BLD AUTO: 3.88 M/UL (ref 4.6–6.2)
SARS-COV-2 RDRP RESP QL NAA+PROBE: NEGATIVE
SODIUM SERPL-SCNC: 133 MMOL/L (ref 136–145)
WBC # BLD AUTO: 7.57 K/UL (ref 3.9–12.7)

## 2022-09-28 PROCEDURE — 36415 COLL VENOUS BLD VENIPUNCTURE: CPT

## 2022-09-28 PROCEDURE — 25000003 PHARM REV CODE 250

## 2022-09-28 PROCEDURE — 25000003 PHARM REV CODE 250: Performed by: STUDENT IN AN ORGANIZED HEALTH CARE EDUCATION/TRAINING PROGRAM

## 2022-09-28 PROCEDURE — U0002 COVID-19 LAB TEST NON-CDC: HCPCS | Performed by: FAMILY MEDICINE

## 2022-09-28 PROCEDURE — 97116 GAIT TRAINING THERAPY: CPT | Mod: CQ

## 2022-09-28 PROCEDURE — 25000003 PHARM REV CODE 250: Performed by: NURSE PRACTITIONER

## 2022-09-28 PROCEDURE — 84100 ASSAY OF PHOSPHORUS: CPT

## 2022-09-28 PROCEDURE — 27000221 HC OXYGEN, UP TO 24 HOURS

## 2022-09-28 PROCEDURE — 25000242 PHARM REV CODE 250 ALT 637 W/ HCPCS: Performed by: STUDENT IN AN ORGANIZED HEALTH CARE EDUCATION/TRAINING PROGRAM

## 2022-09-28 PROCEDURE — 94799 UNLISTED PULMONARY SVC/PX: CPT

## 2022-09-28 PROCEDURE — 85025 COMPLETE CBC W/AUTO DIFF WBC: CPT

## 2022-09-28 PROCEDURE — 80053 COMPREHEN METABOLIC PANEL: CPT

## 2022-09-28 PROCEDURE — 94640 AIRWAY INHALATION TREATMENT: CPT

## 2022-09-28 PROCEDURE — 97530 THERAPEUTIC ACTIVITIES: CPT | Mod: CQ

## 2022-09-28 PROCEDURE — 99900035 HC TECH TIME PER 15 MIN (STAT)

## 2022-09-28 PROCEDURE — 97530 THERAPEUTIC ACTIVITIES: CPT | Mod: CO

## 2022-09-28 PROCEDURE — 83735 ASSAY OF MAGNESIUM: CPT

## 2022-09-28 PROCEDURE — 97535 SELF CARE MNGMENT TRAINING: CPT | Mod: CO

## 2022-09-28 PROCEDURE — 94761 N-INVAS EAR/PLS OXIMETRY MLT: CPT

## 2022-09-28 RX ORDER — POTASSIUM CHLORIDE 7.45 MG/ML
40 INJECTION INTRAVENOUS ONCE
Status: CANCELLED | OUTPATIENT
Start: 2022-09-28 | End: 2022-09-28

## 2022-09-28 RX ORDER — NAPROXEN SODIUM 220 MG/1
81 TABLET, FILM COATED ORAL DAILY
Qty: 30 TABLET | Refills: 0 | OUTPATIENT
Start: 2022-09-28 | End: 2022-10-28

## 2022-09-28 RX ORDER — POTASSIUM CHLORIDE 750 MG/1
10 TABLET, EXTENDED RELEASE ORAL EVERY 4 HOURS
Status: DISCONTINUED | OUTPATIENT
Start: 2022-09-28 | End: 2022-09-28 | Stop reason: HOSPADM

## 2022-09-28 RX ORDER — SODIUM,POTASSIUM PHOSPHATES 280-250MG
2 POWDER IN PACKET (EA) ORAL ONCE
Status: DISCONTINUED | OUTPATIENT
Start: 2022-09-28 | End: 2022-09-28

## 2022-09-28 RX ORDER — POTASSIUM CHLORIDE 20 MEQ/1
20 TABLET, EXTENDED RELEASE ORAL ONCE
Status: DISCONTINUED | OUTPATIENT
Start: 2022-09-28 | End: 2022-09-28

## 2022-09-28 RX ADMIN — FLUTICASONE FUROATE AND VILANTEROL TRIFENATATE 1 PUFF: 100; 25 POWDER RESPIRATORY (INHALATION) at 08:09

## 2022-09-28 RX ADMIN — DOCUSATE SODIUM AND SENNOSIDES 1 TABLET: 8.6; 5 TABLET, FILM COATED ORAL at 02:09

## 2022-09-28 RX ADMIN — PRAVASTATIN SODIUM 40 MG: 40 TABLET ORAL at 08:09

## 2022-09-28 RX ADMIN — METOPROLOL TARTRATE 50 MG: 50 TABLET, FILM COATED ORAL at 08:09

## 2022-09-28 RX ADMIN — OXYCODONE HYDROCHLORIDE AND ACETAMINOPHEN 1 TABLET: 5; 325 TABLET ORAL at 08:09

## 2022-09-28 RX ADMIN — POTASSIUM CHLORIDE 10 MEQ: 750 TABLET, EXTENDED RELEASE ORAL at 01:09

## 2022-09-28 RX ADMIN — IPRATROPIUM BROMIDE AND ALBUTEROL SULFATE 3 ML: 2.5; .5 SOLUTION RESPIRATORY (INHALATION) at 02:09

## 2022-09-28 RX ADMIN — FUROSEMIDE 20 MG: 20 TABLET ORAL at 08:09

## 2022-09-28 RX ADMIN — ASPIRIN 81 MG: 81 TABLET, CHEWABLE ORAL at 08:09

## 2022-09-28 RX ADMIN — IPRATROPIUM BROMIDE AND ALBUTEROL SULFATE 3 ML: 2.5; .5 SOLUTION RESPIRATORY (INHALATION) at 08:09

## 2022-09-28 NOTE — PLAN OF CARE
09/28/22 0943   Post-Acute Status   Post-Acute Authorization Placement   Discharge Plan   Discharge Plan A Skilled Nursing Facility

## 2022-09-28 NOTE — CARE UPDATE
[facility]    FACILITY TRANSFER ORDERS      Patient Name: Ángel Curtis  YOB: 1950    PCP: Primary Doctor No   PCP Address: None  PCP Phone Number: None  PCP Fax: None    Encounter Date: 09/28/2022    Admit to: [facility]    Vital Signs:  Routine    Diagnoses:   Active Hospital Problems    Diagnosis  POA    *Closed 2-part intertrochanteric fracture of proximal end of left femur, initial encounter [S72.142A]  Yes             Displaced fracture of left femoral neck [S72.002A]  Unknown    Pathologic fracture of neck of left femur [M84.452A]  Unknown    CAD (coronary artery disease) [I25.10]  Yes     S/p 3 stents after MI in Feb '22, s/p ICD 2007  States nonadherance to any medication    PLAN:  - Cardiology consult, f/u recs  - restart prescribed meds (after surgery)  - Echo, f/u results      Acute on chronic systolic congestive heart failure [I50.23]  Yes     Prior diagnosis in Care Everywhere. Endorses recent cough, sleeping sitting up. Currently SpO2 in mid90s on 2lpm. No signs of overload on exam.  Prescribed Lasix 20 at home, does not take     PLAN:  - Echo  - Cards recs  - Diurese prn      AICD (automatic cardioverter/defibrillator) present [Z95.810]  Yes      Resolved Hospital Problems   No resolved problems to display.       Allergies:Review of patient's allergies indicates:  No Known Allergies    Diet: cardiac diet    Activities: Activity as tolerated    Goals of Care Treatment Preferences:  Code Status: Partial Code      Nursing:     Recommendations from Orthopedics:   - WBAT with posterior hip precautions  - Hip abduction pillow on at all times while in bed  - Physical therapy eval and treat   - KIRBY/SCDs  - Multimodal pain control  - Ice pack therapy  - Medicines as below...    - note that 81mg ASA should be given bid for 30 days, then continue on 81mg qd after that   - Clinic follow up 2 weeks with Dr. Woodruff   - Continue appropriate postop medical care per  orders     Labs:       CONSULTS:    Physical Therapy to evaluate and treat.  and Occupational Therapy to evaluate and treat.    Orthopedic Clinic follow up 2 weeks from  with Dr. Woodruff       MISCELLANEOUS CARE:      WOUND CARE ORDERS  Yes: Surgical Wound:  Location: L hip    Consult ET nurse        Apply the following to wound:   Collagenase (Santyl) daily, cover with telfa, wrap with with kerlix dressing    Medications: Review discharge medications with patient and family and provide education.      Current Discharge Medication List        START taking these medications    Details   aspirin 81 MG Chew Take 1 tablet (81 mg total) by mouth once daily. For the next 30 days take two 81mg aspirin per day  After 30 days, return to taking one 81mg aspirin per day for 30 doses  Qty: 30 tablet, Refills: 0           CONTINUE these medications which have NOT CHANGED    Details   albuterol (PROVENTIL/VENTOLIN HFA) 90 mcg/actuation inhaler Inhale 2 puffs into the lungs every 6 (six) hours as needed.      cetirizine (ZYRTEC) 10 MG tablet Take 10 mg by mouth once daily.      fluticasone propionate (FLONASE) 50 mcg/actuation nasal spray 2 sprays by Each Nostril route once daily.      furosemide (LASIX) 20 MG tablet Take 20 mg by mouth 2 (two) times daily.      losartan (COZAAR) 25 MG tablet Take 25 mg by mouth once daily.      metoprolol succinate (TOPROL-XL) 200 MG 24 hr tablet Take 200 mg by mouth once daily.      pravastatin (PRAVACHOL) 40 MG tablet Take 40 mg by mouth once daily.      SYMBICORT 160-4.5 mcg/actuation HFAA Inhale 2 puffs into the lungs 2 (two) times a day.      aspirin (ECOTRIN) 81 MG EC tablet Take 81 mg by mouth once daily.      buPROPion HCL, smoking deter, (ZYBAN) 150 mg TBSR 12 hr tablet Take 150 mg by mouth 2 (two) times a day.      nicotine (NICODERM CQ) 21 mg/24 hr SMARTSIG:Patch(s) Topical Daily      nicotine polacrilex 2 MG Lozg SMARTSI Lozenge(s) By Mouth Every 2 Hours PRN           STOP  taking these medications       spironolactone (ALDACTONE) 25 MG tablet Comments:   Reason for Stopping:                  Immunizations Administered as of 9/28/2022       No immunizations on file.            COVID Vaccine status: no records on file    Some patients may experience side effects after vaccination.  These may include fever, headache, muscle or joint aches.  Most symptoms resolve with 24-48 hours and do not require urgent medical evaluation unless they persist for more than 72 hours or symptoms are concerning for an unrelated medical condition.          _________________________________  Ha Landa MD  09/28/2022

## 2022-09-28 NOTE — PLAN OF CARE
Per [facility] Phone: [phone], she will contact family. SW informed her that pt is medically ready for d/c today. MICHEL will follow.     SIMON Falcon  [phone]    Future Appointments   Date Time Provider Department Center   10/3/2022  2:40 PM [facility]        09/28/22 1058   Post-Acute Status   Post-Acute Authorization Placement   Discharge Plan   Discharge Plan A Skilled Nursing Facility

## 2022-09-28 NOTE — PLAN OF CARE
Patient on oxygen in no apparent distress, given arousal treatment, no adverse reactions will continue to monitor.

## 2022-09-28 NOTE — PLAN OF CARE
SW called and spoke with pts wife Sergei [phone] to discuss final d/c assessment. Pt will d/c by zac hackett to [facility] Phone: [phone]. Nilsa with [facility] informed sw that report can be called to the main number and that the pt will be going to room 413B. Pt's wife verbally singed pt choice form. Pt will d/c by zac hackett, ute set transport up for 3:30pm. Rounds completed on pt.  All questions addressed.  Bedside nurse to discuss d/c medications.  Discussed importance to attend all f/u appts and take medications as prescribed.  Verbalized understanding.    SIMON Falcon  [phone]    Future Appointments   Date Time Provider Department Center   10/3/2022  2:40 PM [facility]        09/28/22 1432   Final Note   Assessment Type Final Discharge Note   Anticipated Discharge Disposition SNF   What phone number can be called within the next 1-3 days to see how you are doing after discharge? [phone]   Hospital Resources/Appts/Education Provided Appointments scheduled and added to AVS   Post-Acute Status   Post-Acute Authorization Placement   Post-Acute Placement Status Set-up Complete/Auth obtained   Coverage PHN   Discharge Delays None known at this time

## 2022-09-28 NOTE — PLAN OF CARE
Pt AAOx4. PRN percocet given for complaints of pain. Medications administered per MAR. On 2L NC, cont pulse ox with sats 94-97%. L hip dsg CDI. Cardiac monitoring in progress. Blood glucose monitored, order D/C'd this AM. Safety maintained with bed alarm set, side rails raised, and call light in reach.

## 2022-09-28 NOTE — PT/OT/SLP PROGRESS
Occupational Therapy   Treatment    Name: Ángel Curtis  MRN: [id number]  Admitting Diagnosis:  Closed 2-part intertrochanteric fracture of proximal end of left femur, initial encounter  4 Days Post-Op    Recommendations:     Discharge Recommendations: other (see comments) (post acute placement)  Discharge Equipment Recommendations:  other (see comments) (defer to post acute placement)  Barriers to discharge:  Other (Comment) (Increased level of assistance)    Assessment:     Ángel Curtis is a 72 y.o. male with a medical diagnosis of Closed 2-part intertrochanteric fracture of proximal end of left femur, initial encounter.  Performance deficits affecting function are weakness, impaired endurance, impaired self care skills, impaired functional mobility, gait instability, impaired balance, decreased upper extremity function, decreased lower extremity function, decreased safety awareness, pain, decreased ROM, decreased coordination, impaired skin, edema, orthopedic precautions. Pt found in bed, agreeable to therapy.  Pt requires Mod/Max A x 2 for bed mobility and transferred to and from [facility] with Mod/Max A x 2.  Pt continues with increased pain in L hip with wt bearing and mobility.  He is progressing towards goals. Continue OT services to address functional goals, progressing as able.      Rehab Prognosis:  Good; patient would benefit from acute skilled OT services to address these deficits and reach maximum level of function.       Plan:     Patient to be seen 5 x/week to address the above listed problems via self-care/home management, therapeutic activities, therapeutic exercises  Plan of Care Expires: 10/25/22  Plan of Care Reviewed with: patient, spouse    Subjective     Pain/Comfort:  Pain Rating 1:  (L hip pain which increases with mvmt and wt bearing-did not rate)    Objective:     Communicated with: RN prior to session.  Patient found HOB elevated with peripheral IV, telemetry upon OT entry to room.    General  Precautions: Standard, fall   Orthopedic Precautions:LLE weight bearing as tolerated, LLE posterior precautions   Braces: N/A  Respiratory Status: Room air     Occupational Performance:     Bed Mobility:  Increased pain  Patient completed Rolling/Turning to Left with  maximal assistance and with side rail  Patient completed Scooting/Bridging with contact guard assistance to scoot seated to EOB  Patient completed Supine to Sit with maximal assistance and 2 persons, HOB elevated, increased time and effort, increased pain, vc's for effective technique  Patient completed Sit to Supine with moderate assistance and 2 persons     Functional Mobility/Transfers: Increased pain   Patient completed Sit <> Stand Transfer with minimum assistance and of 2 persons  with  rolling walker   Patient completed Toilet Transfer Stand Pivot technique with maximal assistance, and of 2 persons, without rolling walker and moderate assistance, and of 2 persons with RW.   Functional Mobility: Pt ambulated with Min A x 1-2, for safety, using RW.  Pt requires vcs for upright posture, sequencing steps,  and RW mgmt/safety.     Activities of Daily Living:  Upper Body Dressing: stand by assistance dof/don gown  Toileting: maximal assistance for hygiene in standing.       [facility] 6 Click ADL: 17    Treatment & Education:  Pt sat EOB with Supervision.     Patient left HOB elevated with all lines intact, call button in reach, bed alarm on, spouse present, and setup with lunch tray    GOALS:   Multidisciplinary Problems       Occupational Therapy Goals          Problem: Occupational Therapy    Goal Priority Disciplines Outcome Interventions   Occupational Therapy Goal     OT, PT/OT Ongoing, Progressing    Description: Goals to be met by: 10/25     Patient will increase functional independence with ADLs by performing:    LE Dressing with Minimal Assistance and Assistive Devices as needed.  Grooming while standing at sink with Stand-by Assistance and  Contact Guard Assistance.  Toileting from bedside commode with Minimal Assistance for hygiene and clothing management.   Toilet transfer to bedside commode with Minimal Assistance.  Increased functional strength to WFL for ADLs.                         Time Tracking:     OT Date of Treatment: 09/28/22  OT Start Time: 1125  OT Stop Time: 1157  OT Total Time (min): 32 min    Billable Minutes:Self Care/Home Management 15  Therapeutic Activity 17            9/28/2022

## 2022-09-28 NOTE — ASSESSMENT & PLAN NOTE
Mechanical fall 9/22 approx 2200, fell to Left side, denies LOC or head trauma  Evidence of fx on xray  Cardiac clearance given due to ICD placed in 2007    PLAN:  - Ortho consulted. S/p L hemiarthroplasty on 9/24.   -WBAT with posterior hip precautions  -Hip abduction pillow on at all times while in bed  - PT/OT  - KIRBY/SCDs  - Multimodal pain control  - Ice pack therapy  - Recommend DVT chemoprophylaxis with lovenox while inpatient; ASA 81mg BID for 30 days upon discharge  - Clinic follow up 2 weeks with Dr. Woodruff

## 2022-09-28 NOTE — PLAN OF CARE
MICHEL spoke with Sunitha ramos at [facility] she stated that the family will sign paperwork between 1-2pm. MICHEL called Madeline with [facility] she stated she will approve auth then fax auth to SNF and to SW. MICHEL will follow.     SIMON Falcon  [phone]    Future Appointments   Date Time Provider Department Center   10/3/2022  2:40 PM [facility]        09/28/22 1147   Post-Acute Status   Post-Acute Authorization Placement   Coverage PHN   Discharge Delays None known at this time   Discharge Plan   Discharge Plan A Skilled Nursing Facility

## 2022-09-28 NOTE — PLAN OF CARE
Problem: Occupational Therapy  Goal: Occupational Therapy Goal  Description: Goals to be met by: 10/25     Patient will increase functional independence with ADLs by performing:    LE Dressing with Minimal Assistance and Assistive Devices as needed.  Grooming while standing at sink with Stand-by Assistance and Contact Guard Assistance.  Toileting from bedside commode with Minimal Assistance for hygiene and clothing management.   Toilet transfer to bedside commode with Minimal Assistance.  Increased functional strength to [facility] for ADLs.    Outcome: Ongoing, Progressing   Ángel Curtis is a 72 y.o. male with a medical diagnosis of Closed 2-part intertrochanteric fracture of proximal end of left femur, initial encounter.  Performance deficits affecting function are weakness, impaired endurance, impaired self care skills, impaired functional mobility, gait instability, impaired balance, decreased upper extremity function, decreased lower extremity function, decreased safety awareness, pain, decreased ROM, decreased coordination, impaired skin, edema, orthopedic precautions. Pt found in bed, agreeable to therapy.  Pt requires Mod/Max A x 2 for bed mobility and [place] [facility] Mod/Max A x 2.  Pt continues with increased pain in L hip with wt bearing and mobility.  He is progressing towards goals. Continue OT services to address functional goals, progressing as able.

## 2022-09-28 NOTE — PLAN OF CARE
SW sent facility transfer orders. MICHEL will follow.    SIMON Falcon  [phone]    Future Appointments   Date Time Provider Department Center   10/3/2022  2:40 PM Ha Brown MD [facility] RAJ Ruiz

## 2022-09-28 NOTE — DISCHARGE SUMMARY
[facility] Medicine  Discharge Summary      Patient Name: Ángel Curtis  MRN: [id number]  Patient Class: IP- Inpatient  Admission Date: 9/23/2022  Hospital Length of Stay: 5 days  Discharge Date and Time:  09/28/2022 3:29 PM  Attending Physician: Ángel Leo MD   Discharging Provider: Ha Landa MD  Primary Care Provider: Primary Doctor No      HPI:   71 y/o M w/pmhx of MI (Feb '22, 3 stents, 2 other MI in past), ICD (2007), CHF, HTN, COPD.   The patient lives at home with his wife. He suffered a mechanical fall on his way to the bathroom at 2200 on the night before presentation to Emergency. He fell to his left, denies LOC, palpitations, chest pain, SOB, fevers, head trauma. He complains of pain to his left hip. He denies other trauma. He notes a recent mild cough, which he attributes to smoking. The patient has been prescribed several medications for his background conditions, but states he has taken no medications at all for approximately one month. He smokes pack/day regularly and drinks approximately 5 shots of vodka per day. He denies illicit drug use.    ED course, 124/94, , 96.9F, SpO2 98% on 3lpm. No significant lab findings. CXR shows bilateral edema. An xray of the left hip showed an impacted varus angulated left femoral neck fracture. He was admitted to the Family Medicine in-patient service for medical management before and after orthopedic surgery.      Procedure(s) (LRB):  HEMIARTHROPLASTY, HIP (Left)      Hospital Course:   The patient arrived to our department still complaining of hip pain, but much improved with morphine given in Emergency. He required 2lpm, but had no complaints of chest pain, SOB, d/n/v. Ortho was consulted and evaluated the patient in Emergency. Surgery was scheduled after clearance from Cardiology. Cardiology did not object to surgery, but recommended re-starting home meds after surgery. The patient was taken for L hip hemiarthoplasty on  9/24, with no significant complications. PT/OT worked with him post-op. His home medicines were restarted except for spironolactone and losartan which were discontinued due to the patient's well-controlled blood pressure on lasix 20mg bid and metoprolol 50mg bid. The patient was also restarted on ASA 81mg qd therapy, however Ortho would like ASA 81mg given bid for 30 post-op (until 10/26). After he can resume ASA 81mg qd. The patient resumed his COPD inhaler (albuterol-ipratropium) and started on a fluticasone-vilanterol inhaler but still required 5lpm by POD 2. By POD 4, he was making progress with PT/OT and was able to wean himself off of oxygen support. He was instructed to follow up in 2 weeks with Orthopedics clinic with Dr. Woodruff. He should also follow up with his Cardiologist and Primary Care Physician. He was told to notify his healthcare team if he feels severe worsening of his symptoms. He expressed verbal understanding of discharge instructions and return precautions.        Goals of Care Treatment Preferences:  Code Status: Partial Code      Consults:   Consults (From admission, onward)        Status Ordering Provider     Inpatient consult to Social Work  Once        Provider:  (Not yet assigned)    Acknowledged PAUL BRUNO     Inpatient consult to Cardiology-[facility]  Once        Provider:  Sydney Palomo MD    Completed CRISTI OROZCO     Inpatient consult to Orthopedic Surgery  Once        Provider:  Lester Woodruff IV, MD    Completed SHADE MILLER          * Closed 2-part intertrochanteric fracture of proximal end of left femur, initial encounter  Mechanical fall 9/22 approx 2200, fell to Left side, denies LOC or head trauma  Evidence of fx on xray  Cardiac clearance given due to ICD placed in 2007    PLAN:  - Ortho consulted. S/p L hemiarthroplasty on 9/24.   -WBAT with posterior hip precautions  -Hip abduction pillow on at all times while in bed  - PT/OT  - KIRBY/SCDs  -  Multimodal pain control  - Ice pack therapy  - Recommend DVT chemoprophylaxis with lovenox while inpatient; ASA 81mg BID for 30 days upon discharge  - Clinic follow up 2 weeks with Dr. Woodruff       Pathologic fracture of neck of left femur  See above      Displaced fracture of left femoral neck  - ortho following  - f/u PT/OT recs      AICD (automatic cardioverter/defibrillator) present  Cards to evaluate    PLAN:  - f/u post-op as needed      Acute on chronic systolic congestive heart failure  Prior diagnosis in Care Everywhere. Endorses recent cough, sleeping sitting up. Currently SpO2 in mid90s on 2lpm. No signs of overload on exam. EF as of 9/23 - 35.  Prescribed Lasix 20 at home, does not take     PLAN:  - Resumed home dose of Lasix 20 BID         CAD (coronary artery disease)  S/p 3 stents after MI in Feb '22, s/p ICD 2007  States nonadherance to any medication  On ASA, statin, BB, ARB and ALdactone as an outpatient    PLAN:  - BB and statin therapy resumed  -Lovenox started post-op              Final Active Diagnoses:    Diagnosis Date Noted POA    PRINCIPAL PROBLEM:  Closed 2-part intertrochanteric fracture of proximal end of left femur, initial encounter [S72.142A] 09/23/2022 Yes    Displaced fracture of left femoral neck [S72.002A] 09/24/2022 Unknown    Pathologic fracture of neck of left femur [M84.452A] 09/24/2022 Unknown    CAD (coronary artery disease) [I25.10] 09/23/2022 Yes    Acute on chronic systolic congestive heart failure [I50.23] 09/23/2022 Yes    AICD (automatic cardioverter/defibrillator) present [Z95.810] 09/23/2022 Yes      Problems Resolved During this Admission:       Discharged Condition: stable    Disposition: Skilled Nursing Facility    Follow Up:   Follow-up Information     [facility] Follow up.    Why: Office will call with date and time of Cardio and Ortho follow up.  Contact information:  [address] [place] [address] [phone]                        Patient Instructions:      Ambulatory referral/consult to Cardiology   Standing Status: Future   Referral Priority: Routine Referral Type: Consultation   Referral Reason: Specialty Services Required   Requested Specialty: Cardiology   Number of Visits Requested: 1     Ambulatory referral/consult to Orthopedics   Standing Status: Future   Referral Priority: Routine Referral Type: Consultation   Referred to Provider: ITZEL SOTO IV Requested Specialty: Orthopedic Surgery   Number of Visits Requested: 1     Diet Cardiac     Notify your health care provider if you experience any of the following:  increased confusion or weakness     Notify your health care provider if you experience any of the following:  persistent dizziness, light-headedness, or visual disturbances     Notify your health care provider if you experience any of the following:  worsening rash     Notify your health care provider if you experience any of the following:  severe persistent headache     Notify your health care provider if you experience any of the following:  difficulty breathing or increased cough     Notify your health care provider if you experience any of the following:  redness, tenderness, or signs of infection (pain, swelling, redness, odor or green/yellow discharge around incision site)     Notify your health care provider if you experience any of the following:  severe uncontrolled pain     Notify your health care provider if you experience any of the following:  persistent nausea and vomiting or diarrhea     Notify your health care provider if you experience any of the following:  temperature >100.4     Change dressing (specify)   Order Comments: Dressing change: 1 times per day using absorbent dressing.     Activity as tolerated       Significant Diagnostic Studies: Labs:   CBC   Recent Labs   Lab 09/27/22  0454 09/28/22  0629   WBC 6.72 7.57   HGB 12.1* 13.1*   HCT 36.0* 39.4*   * 139*       Pending Diagnostic  Studies:     Procedure Component Value Units Date/Time    COVID-19 Routine Screening [[id number]] Collected: 09/28/22 1331    Order Status: Sent Lab Status: In process Updated: 09/28/22 1339    Specimen: Nasopharyngeal     COVID-19 Routine Screening Extended Care Placement [[id number]] Collected: 09/28/22 1159    Order Status: Sent Lab Status: In process Updated: 09/28/22 1253    Specimen: Nasopharyngeal          Medications:  Reconciled Home Medications:      Medication List      CHANGE how you take these medications    * aspirin 81 MG Chew  Take 1 tablet (81 mg total) by mouth once daily. For the next 30 days take two 81mg aspirin per day  After 30 days, return to taking one 81mg aspirin per day for 30 doses  What changed: You were already taking a medication with the same name, and this prescription was added. Make sure you understand how and when to take each.     * aspirin 81 MG EC tablet  Commonly known as: ECOTRIN  Take 81 mg by mouth once daily.  What changed: Another medication with the same name was added. Make sure you understand how and when to take each.         * This list has 2 medication(s) that are the same as other medications prescribed for you. Read the directions carefully, and ask your doctor or other care provider to review them with you.            CONTINUE taking these medications    albuterol 90 mcg/actuation inhaler  Commonly known as: PROVENTIL/VENTOLIN HFA  Inhale 2 puffs into the lungs every 6 (six) hours as needed.     buPROPion HCL (smoking deter) 150 mg TBSR 12 hr tablet  Commonly known as: ZYBAN  Take 150 mg by mouth 2 (two) times a day.     cetirizine 10 MG tablet  Commonly known as: ZYRTEC  Take 10 mg by mouth once daily.     fluticasone propionate 50 mcg/actuation nasal spray  Commonly known as: FLONASE  2 sprays by Each Nostril route once daily.     furosemide 20 MG tablet  Commonly known as: LASIX  Take 20 mg by mouth 2 (two) times daily.     metoprolol succinate 200 MG 24 hr  tablet  Commonly known as: TOPROL-XL  Take 200 mg by mouth once daily.     nicotine 21 mg/24 hr  Commonly known as: NICODERM CQ  SMARTSIG:Patch(s) Topical Daily     nicotine polacrilex 2 MG Lozg  SMARTSI Lozenge(s) By Mouth Every 2 Hours PRN     pravastatin 40 MG tablet  Commonly known as: PRAVACHOL  Take 40 mg by mouth once daily.     SYMBICORT 160-4.5 mcg/actuation Hfaa  Generic drug: budesonide-formoterol 160-4.5 mcg  Inhale 2 puffs into the lungs 2 (two) times a day.        STOP taking these medications    losartan 25 MG tablet  Commonly known as: COZAAR     spironolactone 25 MG tablet  Commonly known as: ALDACTONE            Indwelling Lines/Drains at time of discharge:   Lines/Drains/Airways     Drain  Duration           Male External Urinary Catheter 22 [id number] 1 day                Time spent on the discharge of patient: 40 minutes         aH Landa MD  Department of Hospital Medicine  [facility]

## 2022-09-28 NOTE — NURSING
Report phoned to [facility]. Spoke with nurse Reynoso. To transfer via wheelchair van with 02

## 2022-09-28 NOTE — PROGRESS NOTES
[facility] Medicine  Progress Note    Patient Name: Ángel Curtis  MRN: [id number]  Patient Class: IP- Inpatient   Admission Date: 9/23/2022  Length of Stay: 5 days  Attending Physician: Ángel Leo MD  Primary Care Provider: Primary Doctor No        Subjective:     Principal Problem:Closed 2-part intertrochanteric fracture of proximal end of left femur, initial encounter        HPI:  71 y/o M w/pmhx of MI (Feb '22, 3 stents, 2 other MI in past), ICD (2007), CHF, HTN, COPD.   The patient lives at home with his wife. He suffered a mechanical fall on his way to the bathroom at 2200 on the night before presentation to Emergency. He fell to his left, denies LOC, palpitations, chest pain, SOB, fevers, head trauma. He complains of pain to his left hip. He denies other trauma. He notes a recent mild cough, which he attributes to smoking. The patient has been prescribed several medications for his background conditions, but states he has taken no medications at all for approximately one month. He smokes pack/day regularly and drinks approximately 5 shots of vodka per day. He denies illicit drug use.    ED course, 124/94, , 96.9F, SpO2 98% on 3lpm. No significant lab findings. CXR shows bilateral edema. An xray of the left hip showed an impacted varus angulated left femoral neck fracture. He was admitted to the Family Medicine in-patient service for medical management before and after orthopedic surgery.      Overview/Hospital Course:  The patient arrived to our department still complaining of hip pain, but much improved with morphine given in Emergency. He required 2lpm, but had no complaints of chest pain, SOB, d/n/v. Ortho was consulted and evaluated the patient in Emergency. Surgery was scheduled after clearance from Cardiology. Cardiology did not object to surgery, but recommended re-starting home meds after surgery. The patient was taken for L hip hemiarthoplasty on 9/24, with no significant  complications. PT/OT worked with him post-op. His home medicines were restarted, but the patient still required 5lpm by POD 2. He received duonebs treatments, plus a LAMA.     Clinic follow up 2 weeks with Ortho Dr. Woodruff       Interval History:   NAEON. VSSAF on 3lpm (down from 5lpm overnight last night). O2sats in lo 90s. No resp complaints. Still endorses pain, weakness to L hip. Working with PT/OT.    Review of Systems   Constitutional:  Negative for fatigue and fever.   Respiratory:  Negative for chest tightness and shortness of breath.    Cardiovascular:  Negative for chest pain, palpitations and leg swelling.   Gastrointestinal:  Negative for abdominal pain, constipation, diarrhea, nausea and vomiting.   Musculoskeletal:  Positive for arthralgias.        L hip   Neurological:  Negative for dizziness, syncope, weakness, light-headedness, numbness and headaches.       Objective:     Vital Signs (Most Recent):  Temp: 98.3 °F (36.8 °C) (09/28/22 0427)  Pulse: 72 (09/28/22 0427)  Resp: 16 (09/28/22 0427)  BP: (!) 142/83 (09/28/22 0427)  SpO2: 97 % (09/28/22 0427)   Vital Signs (24h Range):  Temp:  [96.4 °F (35.8 °C)-98.3 °F (36.8 °C)] 98.3 °F (36.8 °C)  Pulse:  [63-92] 72  Resp:  [16-20] 16  SpO2:  [94 %-98 %] 97 %  BP: (106-142)/(56-83) 142/83     Weight: 81.8 kg (180 lb 5.4 oz)  Body mass index is 25.88 kg/m².    Intake/Output Summary (Last 24 hours) at 9/28/2022 0750  Last data filed at 9/28/2022 0003  Gross per 24 hour   Intake --   Output 400 ml   Net -400 ml      Physical Exam  Constitutional:       Appearance: Normal appearance.   HENT:      Head: Normocephalic.      Mouth/Throat:      Mouth: Mucous membranes are moist.   Eyes:      Extraocular Movements: Extraocular movements intact.      Pupils: Pupils are equal, round, and reactive to light.   Cardiovascular:      Rate and Rhythm: Rhythm irregular.      Pulses: Normal pulses.      Heart sounds: Normal heart sounds.   Pulmonary:      Effort: Pulmonary  effort is normal.      Breath sounds: Normal breath sounds. No rhonchi or rales.      Comments: Lung sounds improved since admission  Abdominal:      General: Bowel sounds are normal.      Tenderness: There is no abdominal tenderness. There is no guarding.   Musculoskeletal:      Cervical back: Normal range of motion and neck supple. No tenderness.      Comments: S/p L hip hemiarthoplasty, bandage clean, dry, intact   Skin:     General: Skin is warm.      Coloration: Skin is not pale.   Neurological:      General: No focal deficit present.      Mental Status: He is alert and oriented to person, place, and time.      Cranial Nerves: No cranial nerve deficit.      Sensory: No sensory deficit.       Significant Labs: All pertinent labs within the past 24 hours have been reviewed.  CBC:   Recent Labs   Lab 09/27/22 0454 09/28/22  0629   WBC 6.72 7.57   HGB 12.1* 13.1*   HCT 36.0* 39.4*   * 139*     CMP:   Recent Labs   Lab 09/27/22 0454   *   K 3.2*   CL 95   CO2 29   *   BUN 19   CREATININE 0.7   CALCIUM 9.0   PROT 6.4   ALBUMIN 2.2*   BILITOT 1.1*   ALKPHOS 64   AST 38   ALT 14   ANIONGAP 10       Significant Imaging: I have reviewed all pertinent imaging results/findings within the past 24 hours.      Assessment/Plan:      * Closed 2-part intertrochanteric fracture of proximal end of left femur, initial encounter  Mechanical fall 9/22 approx 2200, fell to Left side, denies LOC or head trauma  Evidence of fx on xray  Cardiac clearance given due to ICD placed in 2007    PLAN:  - Ortho consulted. S/p L hemiarthroplasty on 9/24.   -WBAT with posterior hip precautions  -Hip abduction pillow on at all times while in bed  - PT/OT  - KIRBY/SCDs  - Multimodal pain control  - Ice pack therapy  - Recommend DVT chemoprophylaxis with lovenox while inpatient; ASA 81mg BID for 30 days upon discharge  - Clinic follow up 2 weeks with Dr. Woodruff       Pathologic fracture of neck of left femur  See  above      Displaced fracture of left femoral neck  - ortho following  - f/u PT/OT recs      AICD (automatic cardioverter/defibrillator) present  Cards to evaluate    PLAN:  - f/u post-op as needed      Acute on chronic systolic congestive heart failure  Prior diagnosis in Care Everywhere. Endorses recent cough, sleeping sitting up. Currently SpO2 in mid90s on 2lpm. No signs of overload on exam. EF as of 9/23 - 35.  Prescribed Lasix 20 at home, does not take     PLAN:  - Resumed home dose of Lasix 20 BID         CAD (coronary artery disease)  S/p 3 stents after MI in Feb '22, s/p ICD 2007  States nonadherance to any medication  On ASA, statin, BB, ARB and ALdactone as an outpatient    PLAN:  - BB and statin therapy resumed  -Home ARB and Aldactone still held as BP is normotensive while inpatient  -Lovenox started post-op              VTE Risk Mitigation (From admission, onward)         Ordered     enoxaparin injection 40 mg  Daily         09/24/22 1409     IP VTE HIGH RISK PATIENT  Once         09/23/22 1120                Discharge Planning   FABIENNE:      Code Status: Partial Code   Is the patient medically ready for discharge?:     Reason for patient still in hospital (select all that apply): Patient trending condition  Discharge Plan A: Skilled Nursing Facility                  Ha Landa MD  Department of Hospital Medicine   [facility] S[facility]

## 2022-09-28 NOTE — PT/OT/SLP PROGRESS
Physical Therapy Treatment    Patient Name:  Ángel Curtis   MRN:  [id number]    Recommendations:     Discharge Recommendations:   (post acute placement)   Discharge Equipment Recommendations:  (TBD)   Barriers to discharge:  decreased mobility,strength and endurance    Assessment:     Ángel Curtis is a 72 y.o. male admitted with a medical diagnosis of Closed 2-part intertrochanteric fracture of proximal end of left femur, initial encounter.  He presents with the following impairments/functional limitations:  weakness, impaired endurance, impaired functional mobility, gait instability, impaired balance, decreased lower extremity function, decreased safety awareness, pain, decreased ROM, impaired coordination, orthopedic precautions,pt with good participation and requires assistance with all mobility at this time,pt will benefit from post acute services upon discharge.    Rehab Prognosis: Fair; patient would benefit from acute skilled PT services to address these deficits and reach maximum level of function.    Recent Surgery: Procedure(s) (LRB):  HEMIARTHROPLASTY, HIP (Left) 4 Days Post-Op    Plan:     During this hospitalization, patient to be seen daily to address the identified rehab impairments via gait training, therapeutic activities, therapeutic exercises, neuromuscular re-education and progress toward the following goals:    Plan of Care Expires:  10/20/22    Subjective     Chief Complaint: n/a  Patient/Family Comments/goals: pt agreeable to rx  Pain/Comfort:  Pain Rating 1:  (no c/o's)  Location - Side 1: Left  Location - Orientation 1: generalized  Location 1: hip  Pain Addressed 1: Pre-medicate for activity, Reposition, Distraction      Objective:     Communicated with nsg prior to session.  Patient found supine with bed alarm, peripheral IV, telemetry upon PT entry to room.     General Precautions: Standard, fall   Orthopedic Precautions:LLE weight bearing as tolerated, LLE posterior precautions   Braces:  N/A  Respiratory Status: Room air     Functional Mobility:  Bed Mobility:     Supine to Sit: minimum assistance and of 2 persons  Sit to Supine: moderate assistance and at le's  Transfers:     Sit to Stand:  minimum assistance and of 2 persons with rolling walker  Toilet Transfer: moderate assistance, maximal assistance, of 2 persons, and w/o AD with  hand-held assist  using  Stand Pivot  Gait: amb ~5-6 steps with RW and Min/Mod A   Balance: fair standing balance with RW      AM-PAC 6 CLICK MOBILITY  Turning over in bed (including adjusting bedclothes, sheets and blankets)?: 2  Sitting down on and standing up from a chair with arms (e.g., wheelchair, bedside commode, etc.): 2  Moving from lying on back to sitting on the side of the bed?: 2  Moving to and from a bed to a chair (including a wheelchair)?: 2  Need to walk in hospital room?: 2  Climbing 3-5 steps with a railing?: 1  Basic Mobility Total Score: 11       Therapeutic Activities and Exercises: pt used b/s commode with Min A for cleaning,rest periods PRN during rx.       Patient left supine with all lines intact, call button in reach, bed alarm on, and nsg notified..    GOALS:   Multidisciplinary Problems       Physical Therapy Goals          Problem: Physical Therapy    Goal Priority Disciplines Outcome Goal Variances Interventions   Physical Therapy Goal     PT, PT/OT Ongoing, Progressing     Description: Goals to be met by: 10/10/22    Patient will increase functional independence with mobility by performin.  Supine to/from sit with CG of 1  2.  Bed to/from chair with RW with CG of 1 with WBAT LLE  3.  Up in chair 1 hour  4.  Ambulate 75' with RW with CG of 1 with WBAT LLE  5.  Knowledge of hip precautions                         Time Tracking:     PT Received On: 22  PT Start Time: 1125     PT Stop Time: 1157  PT Total Time (min): 32 min     Billable Minutes: Gait Training 16 and Therapeutic Activity 16       PT/PTA: PTA     PTA Visit Number: 1      09/28/2022

## 2022-09-28 NOTE — SUBJECTIVE & OBJECTIVE
Interval History:   NAEON. VSSAF on 3lpm (down from 5lpm overnight last night). O2sats in lo 90s. No resp complaints. Still endorses pain, weakness to L hip. Working with PT/OT.    Review of Systems   Constitutional:  Negative for fatigue and fever.   Respiratory:  Negative for chest tightness and shortness of breath.    Cardiovascular:  Negative for chest pain, palpitations and leg swelling.   Gastrointestinal:  Negative for abdominal pain, constipation, diarrhea, nausea and vomiting.   Musculoskeletal:  Positive for arthralgias.        L hip   Neurological:  Negative for dizziness, syncope, weakness, light-headedness, numbness and headaches.       Objective:     Vital Signs (Most Recent):  Temp: 98.3 °F (36.8 °C) (09/28/22 0427)  Pulse: 72 (09/28/22 0427)  Resp: 16 (09/28/22 0427)  BP: (!) 142/83 (09/28/22 0427)  SpO2: 97 % (09/28/22 0427)   Vital Signs (24h Range):  Temp:  [96.4 °F (35.8 °C)-98.3 °F (36.8 °C)] 98.3 °F (36.8 °C)  Pulse:  [63-92] 72  Resp:  [16-20] 16  SpO2:  [94 %-98 %] 97 %  BP: (106-142)/(56-83) 142/83     Weight: 81.8 kg (180 lb 5.4 oz)  Body mass index is 25.88 kg/m².    Intake/Output Summary (Last 24 hours) at 9/28/2022 0750  Last data filed at 9/28/2022 0003  Gross per 24 hour   Intake --   Output 400 ml   Net -400 ml      Physical Exam  Constitutional:       Appearance: Normal appearance.   HENT:      Head: Normocephalic.      Mouth/Throat:      Mouth: Mucous membranes are moist.   Eyes:      Extraocular Movements: Extraocular movements intact.      Pupils: Pupils are equal, round, and reactive to light.   Cardiovascular:      Rate and Rhythm: Rhythm irregular.      Pulses: Normal pulses.      Heart sounds: Normal heart sounds.   Pulmonary:      Effort: Pulmonary effort is normal.      Breath sounds: Normal breath sounds. No rhonchi or rales.      Comments: Lung sounds improved since admission  Abdominal:      General: Bowel sounds are normal.      Tenderness: There is no abdominal  tenderness. There is no guarding.   Musculoskeletal:      Cervical back: Normal range of motion and neck supple. No tenderness.      Comments: S/p L hip hemiarthoplasty, bandage clean, dry, intact   Skin:     General: Skin is warm.      Coloration: Skin is not pale.   Neurological:      General: No focal deficit present.      Mental Status: He is alert and oriented to person, place, and time.      Cranial Nerves: No cranial nerve deficit.      Sensory: No sensory deficit.       Significant Labs: All pertinent labs within the past 24 hours have been reviewed.  CBC:   Recent Labs   Lab 09/27/22  0454 09/28/22  0629   WBC 6.72 7.57   HGB 12.1* 13.1*   HCT 36.0* 39.4*   * 139*     CMP:   Recent Labs   Lab 09/27/22  0454   *   K 3.2*   CL 95   CO2 29   *   BUN 19   CREATININE 0.7   CALCIUM 9.0   PROT 6.4   ALBUMIN 2.2*   BILITOT 1.1*   ALKPHOS 64   AST 38   ALT 14   ANIONGAP 10       Significant Imaging: I have reviewed all pertinent imaging results/findings within the past 24 hours.

## 2022-09-28 NOTE — PLAN OF CARE
Madeline with PHN sent  auth AUTH #  [id number].  will follow.     Everett Muñoz, MSW  [phone]    Future Appointments   Date Time Provider Department Center   10/3/2022  2:40 PM [facility]        09/28/22 1253   Post-Acute Status   Post-Acute Authorization Placement   Discharge Plan   Discharge Plan A Skilled Nursing Facility

## 2022-09-28 NOTE — PLAN OF CARE
SW met with pt's wife at bedside to discuss d/c planning. She stated that [facility] to [facility]. SW called [facility] [phone] and left  requesting a return call. MICHEL called Madeline with [phone], she stated that she has auth just waiting to send it to the facility. MICHEL will follow.     Everett Muñoz, MSW  [phone]    Future Appointments   Date Time Provider Department Center   10/3/2022  2:40 PM Ha Brown MD [facility]

## 2022-09-28 NOTE — ASSESSMENT & PLAN NOTE
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

## 2022-09-29 ENCOUNTER — TELEPHONE (OUTPATIENT)
Dept: ORTHOPEDICS | Facility: CLINIC | Age: 72
End: 2022-09-29
Payer: MEDICARE

## 2022-09-29 ENCOUNTER — PATIENT OUTREACH (OUTPATIENT)
Dept: ADMINISTRATIVE | Facility: OTHER | Age: 72
End: 2022-09-29
Payer: MEDICARE

## 2022-09-29 DIAGNOSIS — R52 PAIN: Primary | ICD-10-CM

## 2022-09-29 NOTE — TELEPHONE ENCOUNTER
----- Message from Skylar Laner sent at 9/29/2022  2:22 PM CDT -----  Regarding: FW: HFU  Good afternoon, I'm following up on this message I sent yesterday.  Patient has since discharged.  Please advise so I can update DC Nurse. Noemy Martini  ----- Message -----  From: Skylar PABON Stefanie  Sent: 9/28/2022   3:01 PM CDT  To: Lester Woodruff IV, MD  Subject: HFU                                              Patient will be discharging from [facility] and will be needing a HFU w/Dr Woodruff.  Please schedule as appropriate as patient had surgery with Dr Woodruff.  Message me back so DC Nurse can relay appointment information to patient prior to discharge.    DX: Closed 2-part intertrochanteric fracture of proximal end of left femur, initial encounter    Noemy Martini  Access Navigator/Discharge

## 2022-09-29 NOTE — PROGRESS NOTES
IP Liaison - Final Visit Note    Patient: Ángel Curtis  MRN:  [id number]  Date of Service:  9/29/2022  Completed by:  KEEGAN Carrizales    Reason for Visit   Patient presents with    IP Liaison Chart Review        Patient Summary     Discharge Date: 9/28/2022  Discharge telephone number/address: [phone] / [facility]  Follow up provider: Ha Landa MD  Follow up appointments: 10/3/2022 @ 2:40pm  Home Health agency & telephone number: n/a  DME ordered &  name: n/a  Assigned OPCM RN/SW: n/a  Report sent to follow up team (PCP/OPCM) via in basket message: n/a  Community Resources provided including agency name & contact info: substance use resources      KEEGAN Carrizales

## 2022-09-29 NOTE — TELEPHONE ENCOUNTER
Attempted to reach pt. Home and mobile non working #'s. Vm lft on spouse phone. I scheduled all of his post-op appts.

## 2022-10-18 ENCOUNTER — OFFICE VISIT (OUTPATIENT)
Dept: ORTHOPEDICS | Facility: CLINIC | Age: 72
End: 2022-10-18
Payer: MEDICARE

## 2022-10-18 ENCOUNTER — HOSPITAL ENCOUNTER (OUTPATIENT)
Dept: RADIOLOGY | Facility: HOSPITAL | Age: 72
Discharge: HOME | End: 2022-10-18
Attending: ORTHOPAEDIC SURGERY
Payer: MEDICARE

## 2022-10-18 VITALS
WEIGHT: 180.31 LBS | HEIGHT: 70 IN | BODY MASS INDEX: 25.81 KG/M2 | DIASTOLIC BLOOD PRESSURE: 75 MMHG | SYSTOLIC BLOOD PRESSURE: 111 MMHG | HEART RATE: 62 BPM

## 2022-10-18 DIAGNOSIS — M84.452D PATHOLOGICAL FRACTURE OF NECK OF LEFT FEMUR WITH ROUTINE HEALING, SUBSEQUENT ENCOUNTER: ICD-10-CM

## 2022-10-18 DIAGNOSIS — R52 PAIN: ICD-10-CM

## 2022-10-18 DIAGNOSIS — S72.142A: Primary | ICD-10-CM

## 2022-10-18 PROCEDURE — 3044F PR MOST RECENT HEMOGLOBIN A1C LEVEL <7.0%: ICD-10-PCS | Mod: CPTII,S$GLB,, | Performed by: PHYSICIAN ASSISTANT

## 2022-10-18 PROCEDURE — 1159F PR MEDICATION LIST DOCUMENTED IN MEDICAL RECORD: ICD-10-PCS | Mod: CPTII,S$GLB,, | Performed by: PHYSICIAN ASSISTANT

## 2022-10-18 PROCEDURE — 73552 XR FEMUR 2 VIEW LEFT: ICD-10-PCS | Mod: 26,LT,, | Performed by: RADIOLOGY

## 2022-10-18 PROCEDURE — 1160F PR REVIEW ALL MEDS BY PRESCRIBER/CLIN PHARMACIST DOCUMENTED: ICD-10-PCS | Mod: CPTII,S$GLB,, | Performed by: PHYSICIAN ASSISTANT

## 2022-10-18 PROCEDURE — 3078F DIAST BP <80 MM HG: CPT | Mod: CPTII,S$GLB,, | Performed by: PHYSICIAN ASSISTANT

## 2022-10-18 PROCEDURE — 4010F PR ACE/ARB THEARPY RXD/TAKEN: ICD-10-PCS | Mod: CPTII,S$GLB,, | Performed by: PHYSICIAN ASSISTANT

## 2022-10-18 PROCEDURE — 73552 X-RAY EXAM OF FEMUR 2/>: CPT | Mod: 26,LT,, | Performed by: RADIOLOGY

## 2022-10-18 PROCEDURE — 3074F SYST BP LT 130 MM HG: CPT | Mod: CPTII,S$GLB,, | Performed by: PHYSICIAN ASSISTANT

## 2022-10-18 PROCEDURE — 3288F FALL RISK ASSESSMENT DOCD: CPT | Mod: CPTII,S$GLB,, | Performed by: PHYSICIAN ASSISTANT

## 2022-10-18 PROCEDURE — 1126F AMNT PAIN NOTED NONE PRSNT: CPT | Mod: CPTII,S$GLB,, | Performed by: PHYSICIAN ASSISTANT

## 2022-10-18 PROCEDURE — 1126F PR PAIN SEVERITY QUANTIFIED, NO PAIN PRESENT: ICD-10-PCS | Mod: CPTII,S$GLB,, | Performed by: PHYSICIAN ASSISTANT

## 2022-10-18 PROCEDURE — 99999 PR PBB SHADOW E&M-EST. PATIENT-LVL III: CPT | Mod: PBBFAC,,, | Performed by: PHYSICIAN ASSISTANT

## 2022-10-18 PROCEDURE — 4010F ACE/ARB THERAPY RXD/TAKEN: CPT | Mod: CPTII,S$GLB,, | Performed by: PHYSICIAN ASSISTANT

## 2022-10-18 PROCEDURE — 1100F PR PT FALLS ASSESS DOC 2+ FALLS/FALL W/INJURY/YR: ICD-10-PCS | Mod: CPTII,S$GLB,, | Performed by: PHYSICIAN ASSISTANT

## 2022-10-18 PROCEDURE — 99024 PR POST-OP FOLLOW-UP VISIT: ICD-10-PCS | Mod: S$GLB,,, | Performed by: PHYSICIAN ASSISTANT

## 2022-10-18 PROCEDURE — 1160F RVW MEDS BY RX/DR IN RCRD: CPT | Mod: CPTII,S$GLB,, | Performed by: PHYSICIAN ASSISTANT

## 2022-10-18 PROCEDURE — 99999 PR PBB SHADOW E&M-EST. PATIENT-LVL III: ICD-10-PCS | Mod: PBBFAC,,, | Performed by: PHYSICIAN ASSISTANT

## 2022-10-18 PROCEDURE — 3074F PR MOST RECENT SYSTOLIC BLOOD PRESSURE < 130 MM HG: ICD-10-PCS | Mod: CPTII,S$GLB,, | Performed by: PHYSICIAN ASSISTANT

## 2022-10-18 PROCEDURE — 3078F PR MOST RECENT DIASTOLIC BLOOD PRESSURE < 80 MM HG: ICD-10-PCS | Mod: CPTII,S$GLB,, | Performed by: PHYSICIAN ASSISTANT

## 2022-10-18 PROCEDURE — 99024 POSTOP FOLLOW-UP VISIT: CPT | Mod: S$GLB,,, | Performed by: PHYSICIAN ASSISTANT

## 2022-10-18 PROCEDURE — 1159F MED LIST DOCD IN RCRD: CPT | Mod: CPTII,S$GLB,, | Performed by: PHYSICIAN ASSISTANT

## 2022-10-18 PROCEDURE — 3044F HG A1C LEVEL LT 7.0%: CPT | Mod: CPTII,S$GLB,, | Performed by: PHYSICIAN ASSISTANT

## 2022-10-18 PROCEDURE — 73552 X-RAY EXAM OF FEMUR 2/>: CPT | Mod: TC,FY,LT

## 2022-10-18 PROCEDURE — 1100F PTFALLS ASSESS-DOCD GE2>/YR: CPT | Mod: CPTII,S$GLB,, | Performed by: PHYSICIAN ASSISTANT

## 2022-10-18 PROCEDURE — 3288F PR FALLS RISK ASSESSMENT DOCUMENTED: ICD-10-PCS | Mod: CPTII,S$GLB,, | Performed by: PHYSICIAN ASSISTANT

## 2022-10-18 RX ORDER — LOSARTAN POTASSIUM 25 MG/1
25 TABLET ORAL DAILY
COMMUNITY
Start: 2022-09-28

## 2022-10-18 RX ORDER — HYDROCODONE BITARTRATE AND ACETAMINOPHEN 5; 325 MG/1; MG/1
TABLET ORAL
COMMUNITY
Start: 2022-09-29

## 2022-10-18 RX ORDER — ALPRAZOLAM 0.25 MG/1
0.25 TABLET ORAL 3 TIMES DAILY PRN
COMMUNITY
Start: 2022-10-10

## 2022-10-18 NOTE — PROGRESS NOTES
[facility], Orthopedics and Sports Medicine  [facility]    Hip Post-op Visit  10/18/2022       Diagnosis:  Left femoral neck fracture  Osteoporotic bone fragility fracture left hip     Procedure: 22  Left hip hemiarthroplasty    Subjective:      Ángel Curtis is a 72 y.o. male who is now 3 weeks status post left hip surgery. The patient is not having any pain. The patient denies none, fever, wound drainage, increasing redness, pus, increasing pain, increasing swelling. Post op problems reported: none.    Patient in wheelchair today. States he is in physical therapy at the facility he staying at- [facility]. States he is unsure of when he is able to leave the facility. Previously lived at home with wife.      Objective:      Ortho/SPM Exam  General: alert, appears older than stated age, and cooperative   Gait: unable to walk  Sutures: No sutures used. Dermabond only.  Incision: healing well, no significant drainage, no dehiscence, no significant erythema  Tenderness: none  Hip Range of Motion: Extension 10 degrees  Flexion 10 degrees  Stability: Normal  Strength: Improving  Vascular: CR<2s. Palpable radial pulse    Imagin views left femur taken today.  Satisfactory left total hip joint hemiarthroplasty. No evidence of loosening or lucency. Severe left knee arthritic changes noted. No acute fractures or dislocations.     X-Ray Femur 2 View Left  Narrative: EXAMINATION:  XR FEMUR 2 VIEW LEFT    CLINICAL HISTORY:  Pain, unspecified    TECHNIQUE:  AP and lateral views of the left femur were performed.    COMPARISON:  None}    FINDINGS:  No evidence of an acute fracture.  There is a right hip arthroplasty.  There are advanced degenerative changes of the knee with osteophytosis and joint space loss.  No radiopaque foreign bodies.  Impression: No acute process.    Right hip arthroplasty and osteoarthritis of the right knee.    Electronically signed by: Albert Jerez  MD  Date:    10/18/2022  Time:    15:10    I have reviewed the above radiograph and agree with the findings stated by the radiologist.         Assessment:       The patient is status post left hip surgery.  The primary encounter diagnosis was Closed 2-part intertrochanteric fracture of proximal end of left femur, initial encounter. A diagnosis of Pathological fracture of neck of left femur with routine healing, subsequent encounter was also pertinent to this visit. Doing well postoperatively. Continuation of post-op rehab course is recommended at this time. All of the patient's questions were answered.    Continue and advance weight bearing as tolerated with walker. Continuation of physical therapy. Posterior hip precautions in place. Patient may leave facility once adequate PT is completed and patient able to ambulate safely with walker. Tylenol for pain.      Plan:      Tylenol 650mg TID PRN for pain.  Continue physical therapy.  Weightbearing as tolerated on operative extremity.  Follow up at 6 weeks after surgery. On 11/3/22 at 11:00 pm with Dr. Woodruff.        Stella Santos PA-C  Department of Orthopedic Surgery  [facility]  Office: [phone]

## 2022-11-02 PROBLEM — Z86.79 H/O CHF: Status: ACTIVE | Noted: 2022-11-02

## 2022-11-02 PROBLEM — Z95.5 H/O HEART ARTERY STENT: Status: ACTIVE | Noted: 2022-11-02

## 2022-11-03 ENCOUNTER — OFFICE VISIT (OUTPATIENT)
Dept: ORTHOPEDICS | Facility: CLINIC | Age: 72
End: 2022-11-03
Payer: MEDICARE

## 2022-11-03 DIAGNOSIS — M84.452D PATHOLOGICAL FRACTURE OF NECK OF LEFT FEMUR WITH ROUTINE HEALING, SUBSEQUENT ENCOUNTER: ICD-10-CM

## 2022-11-03 DIAGNOSIS — S72.142A: Primary | ICD-10-CM

## 2022-11-03 PROCEDURE — 4010F ACE/ARB THERAPY RXD/TAKEN: CPT | Mod: CPTII,S$GLB,, | Performed by: ORTHOPAEDIC SURGERY

## 2022-11-03 PROCEDURE — 4010F PR ACE/ARB THEARPY RXD/TAKEN: ICD-10-PCS | Mod: CPTII,S$GLB,, | Performed by: ORTHOPAEDIC SURGERY

## 2022-11-03 PROCEDURE — 99024 POSTOP FOLLOW-UP VISIT: CPT | Mod: S$GLB,,, | Performed by: ORTHOPAEDIC SURGERY

## 2022-11-03 PROCEDURE — 99024 PR POST-OP FOLLOW-UP VISIT: ICD-10-PCS | Mod: S$GLB,,, | Performed by: ORTHOPAEDIC SURGERY

## 2022-11-03 PROCEDURE — 3044F PR MOST RECENT HEMOGLOBIN A1C LEVEL <7.0%: ICD-10-PCS | Mod: CPTII,S$GLB,, | Performed by: ORTHOPAEDIC SURGERY

## 2022-11-03 PROCEDURE — 3044F HG A1C LEVEL LT 7.0%: CPT | Mod: CPTII,S$GLB,, | Performed by: ORTHOPAEDIC SURGERY

## 2022-11-03 NOTE — PROGRESS NOTES
[facility], Orthopedics and Sports Medicine  [facility]    Hip Post-op Visit  11/03/2022       Diagnosis:  Left femoral neck fracture  Osteoporotic bone fragility fracture left hip      Procedure: 9/24/22  Left hip hemiarthroplasty    Subjective:      Ángel Curtis is a 72 y.o. male who is now 6 weeks status post left hip surgery. The patient is not having any pain. The patient denies fever, wound drainage, increasing redness, pus, increasing pain, increasing swelling. Post op problems reported: none.    Still at nursing home facility at this time.  Working with therapy there.     The patient is doing well overall.  He is in a wheelchair today.  Says he uses walker at facility.      Objective:      Ortho/SPM Exam  General: alert, appears stated age, and cooperative   Gait: antalgic; stands from chair without difficulty   Sutures: Sutures out.  Incision: healing well, no significant drainage, no dehiscence, no significant erythema  Tenderness: none  Hip Range of Motion: Extension 0 degrees  Flexion 90 degrees  Stability: Normal  Strength: Improving  Vascular: CR<2s. Palpable radial pulse    Imaging:  Radiographs of the left hip taken  10/18/2022  were personally reviewed from the [facility] EMR.  These include multiple views showing no acute fractures or dislocations are noted in these images.  There is a well positioned hip prosthesis with no evidence of hardware failure or loosening.      Assessment:       The patient is status post left hip surgery.  The primary encounter diagnosis was Closed 2-part intertrochanteric fracture of proximal end of left femur, initial encounter. A diagnosis of Pathological fracture of neck of left femur with routine healing, subsequent encounter was also pertinent to this visit. Doing well postoperatively. Continuation of post-op rehab course is recommended at this time. All of the patient's questions were answered.    Continue therapy at facility and proceed  with WBAT, gait retraining, return to prior level of function as able.   Progress to walker and/or cane as able.     Okay to discharge home when safe and meets therapy goals at facility.      Plan:      Weightbearing as tolerated on operative extremity.  Follow up at 3 months after surgery.  Continue posterior hip precautions for 3 months after surgery       Lester Woodruff IV, MD   of Clinical Orthopedics  Department of Orthopedic Surgery  [facility]  Office: [phone]  Website: [email or web address]

## 2022-11-22 ENCOUNTER — DOCUMENT SCAN (OUTPATIENT)
Dept: HOME HEALTH SERVICES | Facility: HOSPITAL | Age: 72
End: 2022-11-22
Payer: MEDICARE

## 2022-11-23 ENCOUNTER — OFFICE VISIT (OUTPATIENT)
Dept: ORTHOPEDICS | Facility: CLINIC | Age: 72
End: 2022-11-23
Payer: MEDICARE

## 2022-11-23 VITALS
HEIGHT: 70 IN | HEART RATE: 67 BPM | DIASTOLIC BLOOD PRESSURE: 62 MMHG | WEIGHT: 180.31 LBS | SYSTOLIC BLOOD PRESSURE: 91 MMHG | BODY MASS INDEX: 25.81 KG/M2

## 2022-11-23 DIAGNOSIS — M84.452D PATHOLOGICAL FRACTURE OF NECK OF LEFT FEMUR WITH ROUTINE HEALING, SUBSEQUENT ENCOUNTER: ICD-10-CM

## 2022-11-23 DIAGNOSIS — S72.142A: Primary | ICD-10-CM

## 2022-11-23 PROCEDURE — 3288F PR FALLS RISK ASSESSMENT DOCUMENTED: ICD-10-PCS | Mod: CPTII,S$GLB,, | Performed by: ORTHOPAEDIC SURGERY

## 2022-11-23 PROCEDURE — 99024 POSTOP FOLLOW-UP VISIT: CPT | Mod: S$GLB,,, | Performed by: ORTHOPAEDIC SURGERY

## 2022-11-23 PROCEDURE — 3008F BODY MASS INDEX DOCD: CPT | Mod: CPTII,S$GLB,, | Performed by: ORTHOPAEDIC SURGERY

## 2022-11-23 PROCEDURE — 3078F DIAST BP <80 MM HG: CPT | Mod: CPTII,S$GLB,, | Performed by: ORTHOPAEDIC SURGERY

## 2022-11-23 PROCEDURE — 3044F PR MOST RECENT HEMOGLOBIN A1C LEVEL <7.0%: ICD-10-PCS | Mod: CPTII,S$GLB,, | Performed by: ORTHOPAEDIC SURGERY

## 2022-11-23 PROCEDURE — 1101F PT FALLS ASSESS-DOCD LE1/YR: CPT | Mod: CPTII,S$GLB,, | Performed by: ORTHOPAEDIC SURGERY

## 2022-11-23 PROCEDURE — 1101F PR PT FALLS ASSESS DOC 0-1 FALLS W/OUT INJ PAST YR: ICD-10-PCS | Mod: CPTII,S$GLB,, | Performed by: ORTHOPAEDIC SURGERY

## 2022-11-23 PROCEDURE — 4010F ACE/ARB THERAPY RXD/TAKEN: CPT | Mod: CPTII,S$GLB,, | Performed by: ORTHOPAEDIC SURGERY

## 2022-11-23 PROCEDURE — 3044F HG A1C LEVEL LT 7.0%: CPT | Mod: CPTII,S$GLB,, | Performed by: ORTHOPAEDIC SURGERY

## 2022-11-23 PROCEDURE — 1126F AMNT PAIN NOTED NONE PRSNT: CPT | Mod: CPTII,S$GLB,, | Performed by: ORTHOPAEDIC SURGERY

## 2022-11-23 PROCEDURE — 4010F PR ACE/ARB THEARPY RXD/TAKEN: ICD-10-PCS | Mod: CPTII,S$GLB,, | Performed by: ORTHOPAEDIC SURGERY

## 2022-11-23 PROCEDURE — 3288F FALL RISK ASSESSMENT DOCD: CPT | Mod: CPTII,S$GLB,, | Performed by: ORTHOPAEDIC SURGERY

## 2022-11-23 PROCEDURE — 3074F PR MOST RECENT SYSTOLIC BLOOD PRESSURE < 130 MM HG: ICD-10-PCS | Mod: CPTII,S$GLB,, | Performed by: ORTHOPAEDIC SURGERY

## 2022-11-23 PROCEDURE — 1159F PR MEDICATION LIST DOCUMENTED IN MEDICAL RECORD: ICD-10-PCS | Mod: CPTII,S$GLB,, | Performed by: ORTHOPAEDIC SURGERY

## 2022-11-23 PROCEDURE — 3008F PR BODY MASS INDEX (BMI) DOCUMENTED: ICD-10-PCS | Mod: CPTII,S$GLB,, | Performed by: ORTHOPAEDIC SURGERY

## 2022-11-23 PROCEDURE — 3078F PR MOST RECENT DIASTOLIC BLOOD PRESSURE < 80 MM HG: ICD-10-PCS | Mod: CPTII,S$GLB,, | Performed by: ORTHOPAEDIC SURGERY

## 2022-11-23 PROCEDURE — 1126F PR PAIN SEVERITY QUANTIFIED, NO PAIN PRESENT: ICD-10-PCS | Mod: CPTII,S$GLB,, | Performed by: ORTHOPAEDIC SURGERY

## 2022-11-23 PROCEDURE — 99024 PR POST-OP FOLLOW-UP VISIT: ICD-10-PCS | Mod: S$GLB,,, | Performed by: ORTHOPAEDIC SURGERY

## 2022-11-23 PROCEDURE — 3074F SYST BP LT 130 MM HG: CPT | Mod: CPTII,S$GLB,, | Performed by: ORTHOPAEDIC SURGERY

## 2022-11-23 PROCEDURE — 99999 PR PBB SHADOW E&M-EST. PATIENT-LVL III: CPT | Mod: PBBFAC,,, | Performed by: ORTHOPAEDIC SURGERY

## 2022-11-23 PROCEDURE — 1159F MED LIST DOCD IN RCRD: CPT | Mod: CPTII,S$GLB,, | Performed by: ORTHOPAEDIC SURGERY

## 2022-11-23 PROCEDURE — 99999 PR PBB SHADOW E&M-EST. PATIENT-LVL III: ICD-10-PCS | Mod: PBBFAC,,, | Performed by: ORTHOPAEDIC SURGERY

## 2022-11-23 RX ORDER — CEFTRIAXONE 1 G/1
INJECTION, POWDER, FOR SOLUTION INTRAMUSCULAR; INTRAVENOUS
COMMUNITY
Start: 2022-10-20

## 2022-11-23 NOTE — PROGRESS NOTES
[facility], Orthopedics and Sports Medicine  [facility]    Hip Post-op Visit  11/23/2022       Diagnosis:  Left femoral neck fracture  Osteoporotic bone fragility fracture left hip      Procedure: 9/24/22  Left hip hemiarthroplasty    Subjective:      Ángel Curtis is a 72 y.o. male who is now 8 weeks status post left hip surgery. The patient is not having any pain. The patient denies fever, wound drainage, increasing redness, pus, increasing pain, increasing swelling. Post op problems reported: none.    The patient is back at home at this time. His wife is with him today and grandson living with them to help care for patient.  Home health coming by house and working with therapy exercises at home.      Objective:      Ortho/SPM Exam  General: alert, appears stated age, and cooperative   Gait: antalgic  Sutures: Sutures out.  Incision: healing well, no significant drainage, no dehiscence, no significant erythema  Tenderness: none  Hip Range of Motion: Extension 0 degrees  Flexion 90 degrees  Stability: Normal  Strength: Improving  Vascular: CR<2s. Palpable radial pulse    Imaging:  Radiographs of the left hip taken  10/18/2022  were personally reviewed from the [facility] EMR.  These include a standing AP pelvis, AP hip, and a frog leg lateral of the hip.  The hip demonstrates post op changes hemiarthroplasty.  No hardware failure or complications noted.      Assessment:       The patient is status post left hip surgery.  The primary encounter diagnosis was Closed 2-part intertrochanteric fracture of proximal end of left femur, initial encounter. A diagnosis of Pathological fracture of neck of left femur with routine healing, subsequent encounter was also pertinent to this visit. Doing well postoperatively. Continuation of post-op rehab course is recommended at this time. All of the patient's questions were answered.    Needs to continue working with therapy to improve ambulatory function.   Also discussed walker use and nutrition.  Continue hip precautions for 3 months after surgery.      Plan:      Follow up at 3 months after surgery.  WBAT LLE, posterior hip precautions  Walker as needed, then d/c as tolerated       Lester Woodruff IV, MD   of Clinical Orthopedics  Department of Orthopedic Surgery  [facility]  Office: [phone]  Website: [email or web address]

## 2022-11-29 ENCOUNTER — EXTERNAL HOME HEALTH (OUTPATIENT)
Dept: HOME HEALTH SERVICES | Facility: HOSPITAL | Age: 72
End: 2022-11-29
Payer: MEDICARE

## 2022-11-29 ENCOUNTER — TELEPHONE (OUTPATIENT)
Dept: ORTHOPEDICS | Facility: CLINIC | Age: 72
End: 2022-11-29
Payer: MEDICARE

## 2022-11-29 NOTE — TELEPHONE ENCOUNTER
Spoke with patients wife to notify patient can switch to OTC pain meds. Tylenol 1,000mg every 8 hours for pain

## 2022-11-29 NOTE — TELEPHONE ENCOUNTER
----- Message from Lester Woodruff IV, MD sent at 11/28/2022 11:38 AM CST -----  We do not refill narcotic pain medication.     Patient is to switch to taking over the counter pain medication only.  Example: tylenol 1000mg every 8 hours as needed for pain (do not take more than 3000mg tylenol in 24 hours).     ----- Message -----  From: Kisha Appiah MA  Sent: 11/25/2022  11:21 AM CST  To: Lester Woodruff IV, MD      ----- Message -----  From: Shasha Krishnamurthy Patient Care Assistant  Sent: 11/25/2022  11:13 AM CST  To: Ranjan Batista Staff    Type:  RX Refill Request    Who Called:  pt  Refill or New Rx:  new  RX Name and Strength: HYDROcodone-acetaminophen (NORCO) 5-325 mg per tablet  How is the patient currently taking it? (ex. 1XDay):   Is this a 30 day or 90 day RX:   Preferred Pharmacy with phone number:  [facility] [address] [address]   Phone: [phone]  Fax:  [phone]  Local or Mail Order: local  Ordering Provider: Ranjan  Would the patient rather a call back or a response via [facility]?  Call   Best Call Back Number: [phone]   Additional Information:

## 2022-12-07 ENCOUNTER — DOCUMENT SCAN (OUTPATIENT)
Dept: HOME HEALTH SERVICES | Facility: HOSPITAL | Age: 72
End: 2022-12-07
Payer: MEDICARE

## 2022-12-19 ENCOUNTER — DOCUMENT SCAN (OUTPATIENT)
Dept: HOME HEALTH SERVICES | Facility: HOSPITAL | Age: 72
End: 2022-12-19
Payer: MEDICARE

## 2022-12-23 ENCOUNTER — DOCUMENT SCAN (OUTPATIENT)
Dept: HOME HEALTH SERVICES | Facility: HOSPITAL | Age: 72
End: 2022-12-23
Payer: MEDICARE

## 2023-01-26 ENCOUNTER — DOCUMENT SCAN (OUTPATIENT)
Dept: HOME HEALTH SERVICES | Facility: HOSPITAL | Age: 73
End: 2023-01-26
Payer: MEDICARE

## 2023-01-26 PROCEDURE — 99499 UNLISTED E&M SERVICE: CPT | Mod: ,,, | Performed by: ORTHOPAEDIC SURGERY

## 2023-01-26 PROCEDURE — 99499 NO LOS: ICD-10-PCS | Mod: ,,, | Performed by: ORTHOPAEDIC SURGERY

## 2023-03-08 ENCOUNTER — DOCUMENT SCAN (OUTPATIENT)
Dept: HOME HEALTH SERVICES | Facility: HOSPITAL | Age: 73
End: 2023-03-08
Payer: MEDICARE
